# Patient Record
Sex: FEMALE | Race: WHITE | NOT HISPANIC OR LATINO | Employment: FULL TIME | URBAN - METROPOLITAN AREA
[De-identification: names, ages, dates, MRNs, and addresses within clinical notes are randomized per-mention and may not be internally consistent; named-entity substitution may affect disease eponyms.]

---

## 2019-10-14 ENCOUNTER — OFFICE VISIT (OUTPATIENT)
Dept: FAMILY MEDICINE CLINIC | Facility: CLINIC | Age: 44
End: 2019-10-14

## 2019-10-14 VITALS
HEIGHT: 67 IN | RESPIRATION RATE: 16 BRPM | BODY MASS INDEX: 33.27 KG/M2 | TEMPERATURE: 98.5 F | SYSTOLIC BLOOD PRESSURE: 140 MMHG | OXYGEN SATURATION: 97 % | WEIGHT: 212 LBS | DIASTOLIC BLOOD PRESSURE: 86 MMHG | HEART RATE: 82 BPM

## 2019-10-14 DIAGNOSIS — Z00.00 ANNUAL PHYSICAL EXAM: Primary | ICD-10-CM

## 2019-10-14 DIAGNOSIS — I10 ESSENTIAL HYPERTENSION: ICD-10-CM

## 2019-10-14 DIAGNOSIS — E05.00 GRAVES' DISEASE IN REMISSION: ICD-10-CM

## 2019-10-14 PROBLEM — E05.90 HYPERTHYROIDISM: Status: ACTIVE | Noted: 2019-06-05

## 2019-10-14 PROCEDURE — 99396 PREV VISIT EST AGE 40-64: CPT | Performed by: NURSE PRACTITIONER

## 2019-10-14 PROCEDURE — 93000 ELECTROCARDIOGRAM COMPLETE: CPT | Performed by: NURSE PRACTITIONER

## 2019-10-14 RX ORDER — METOPROLOL SUCCINATE 25 MG/1
25 TABLET, EXTENDED RELEASE ORAL DAILY
COMMUNITY
Start: 2019-07-02 | End: 2020-01-28 | Stop reason: SDUPTHER

## 2019-10-14 NOTE — PROGRESS NOTES
Patient ID: Nacho Schwartz is a 44 y.o. female     Subjective     Chief Complaint   Patient presents with   • Establish Care   • Hypertension       History of Present Illness    Nacho Schwartz presents to the office today for new patient evaluation for annual exam for continued management concerning hypertension.  She also has prior history of Graves' disease which is been in remission.  Previous PCP was CESIA Diaz with [facility].  She wanted to transfer to [facility].  New diagnosis of hypertension at beginning of year.  At the time, she was in school and working weekends.    Last 2 years moved twice. Increased stressors at home and work.  Feels has some correlation with her elevated blood pressure.  She originally was started on lisinopril however developed severe cough.  She was later switched to metoprolol with good control of symptoms.  She has enlarged thyroid.  States she has had a previous thyroid ultrasound however is been years ago.  Also was [place] state.  She is past due for Pap and mammogram.  Plans to contact GYN to schedule.      She denies any complaints of fever, chills, cough, chest pain, shortness of air, abdominal pain, nausea, or any other concerns.     The following portions of the patient's history were reviewed and updated as appropriate: allergies, current medications, past family history, past medical history, past social history, past surgical history and problem list.       Review of Systems   Constitution: Negative.   HENT: Negative.    Eyes: Negative.    Cardiovascular: Negative.    Respiratory: Negative.    Endocrine: Negative.    Hematologic/Lymphatic: Negative.    Skin: Negative.    Musculoskeletal: Negative.    Gastrointestinal: Negative.    Genitourinary: Negative.    Neurological: Negative.    Psychiatric/Behavioral: Negative.        Vitals:    10/14/19 1541   BP: 140/86   Pulse: 82   Resp: 16   Temp: 98.5 °F (36.9 °C)   SpO2: 97%       Documented weights     10/14/19 1541   Weight: 96.2 kg (212 lb)     Body mass index is 33.71 kg/m².    No results found for this or any previous visit.    Objective     Physical Exam   Constitutional: She is oriented to person, place, and time. Vital signs are normal. She appears well-developed.   HENT:   Head: Normocephalic and atraumatic.   Right Ear: Tympanic membrane normal.   Left Ear: Tympanic membrane normal.   Mouth/Throat: Oropharynx is clear and moist.   Eyes: EOM are normal. Pupils are equal, round, and reactive to light.   Neck: Normal range of motion. Neck supple. Thyromegaly present.   Cardiovascular: Normal rate, regular rhythm, normal heart sounds and intact distal pulses.   No murmur heard.  Manual B/P rechecked by me at 130/86.   Pulmonary/Chest: Effort normal and breath sounds normal. She has no wheezes. She has no rhonchi. She has no rales.   Abdominal: Soft. Bowel sounds are normal. There is no hepatosplenomegaly. There is no tenderness.   Musculoskeletal: Normal range of motion. She exhibits no edema or tenderness.   Lymphadenopathy:     She has no cervical adenopathy.   Neurological: She is alert and oriented to person, place, and time. She has normal strength.   Skin: Skin is warm and dry. No rash noted. No cyanosis or erythema.   Psychiatric: She has a normal mood and affect. Her behavior is normal.       ECG 12 Lead  Date/Time: 10/14/2019 4:28 PM  Performed by: Dolores Shepherd APRN  Authorized by: Dolores Shepherd APRN   Previous ECG: no previous ECG available  Rhythm: sinus rhythm  Rate: normal  Conduction: conduction normal  ST Segments: ST segments normal  T Waves: T waves normal  QRS axis: normal  Other: no other findings    Clinical impression: normal ECG               Assessment/Plan     Assessment/Plan     Nacho was seen today for establish care and hypertension.    Diagnoses and all orders for this visit:    Annual physical exam  -     Comprehensive Metabolic Panel; Future  -     Lipid Panel; Future  -      TSH; Future  -     Vitamin D 25 Hydroxy; Future  -     T3, Free; Future  -     T4, Free; Future  -     CBC (No Diff); Future    Essential hypertension  -     ECG 12 Lead  -     Comprehensive Metabolic Panel; Future  -     Lipid Panel; Future    Graves' disease in remission  -     TSH; Future  -     T3, Free; Future  -     T4, Free; Future      Summary:  Nacho Schwartz presents office today for new patient evaluation for annual physical exam.  She is not fasting today.  She also has to have her labs completed with [facility].  Provided her for written orders for labs to have completed when able for CBC, CMP, lipid panel, thyroid function test, and vitamin D level.  Once results are received, we will call her with results which will also determine further recommendations.  Possibly will need thyroid ultrasound however awaiting for thyroid function test prior to ordering.  EKG completed in office today was stable.  Her blood pressure is stable.  We will continue her on metoprolol XL 25 mg daily.  Continue to watch her diet and limit added salt.  Drink plenty of fluids and incorporate exercise into daily routine.  Advised to contact GYN to schedule Pap and mammogram as soon as able.  If all labs are stable, instructed she would need to return in 1 year for next annual physical.    In the meantime, instructed to contact us sooner for any problems or concerns.    Patient Counseling:  --Nutrition: Stressed importance of moderation in sodium/caffeine intake, saturated fat and cholesterol.  Discussed caloric balance, sufficient intake of fresh fruits, vegetables, fiber, calcium, iron.  --Exercise: Stressed the importance of regular exercise.   --Substance Abuse: Discussed cessation/primary prevention of tobacco, alcohol, or other drug use; driving or other dangerous activities under the influence.    --Dental health: Discussed importance of regular tooth brushing, flossing, and dental visits.  -- suggested having eyes and vision  checked if needed or past due.  --Immunizations reviewed.    Dolores Shepherd, APRN  Family Medicine  [facility] [place]  10/14/19  4:02 PM

## 2019-10-17 ENCOUNTER — RESULTS ENCOUNTER (OUTPATIENT)
Dept: FAMILY MEDICINE CLINIC | Facility: CLINIC | Age: 44
End: 2019-10-17

## 2019-10-17 DIAGNOSIS — I10 ESSENTIAL HYPERTENSION: ICD-10-CM

## 2019-10-17 DIAGNOSIS — E05.00 GRAVES' DISEASE IN REMISSION: ICD-10-CM

## 2019-10-17 DIAGNOSIS — Z00.00 ANNUAL PHYSICAL EXAM: ICD-10-CM

## 2020-01-28 RX ORDER — METOPROLOL SUCCINATE 25 MG/1
25 TABLET, EXTENDED RELEASE ORAL DAILY
Qty: 90 TABLET | Refills: 2 | Status: SHIPPED | OUTPATIENT
Start: 2020-01-28 | End: 2020-10-12 | Stop reason: SDUPTHER

## 2020-10-12 ENCOUNTER — OFFICE VISIT (OUTPATIENT)
Dept: FAMILY MEDICINE CLINIC | Facility: CLINIC | Age: 45
End: 2020-10-12

## 2020-10-12 ENCOUNTER — RESULTS ENCOUNTER (OUTPATIENT)
Dept: FAMILY MEDICINE CLINIC | Facility: CLINIC | Age: 45
End: 2020-10-12

## 2020-10-12 VITALS
DIASTOLIC BLOOD PRESSURE: 82 MMHG | WEIGHT: 216 LBS | TEMPERATURE: 98.1 F | SYSTOLIC BLOOD PRESSURE: 140 MMHG | HEART RATE: 90 BPM | OXYGEN SATURATION: 97 % | HEIGHT: 67 IN | RESPIRATION RATE: 16 BRPM | BODY MASS INDEX: 33.9 KG/M2

## 2020-10-12 DIAGNOSIS — I10 ESSENTIAL HYPERTENSION: ICD-10-CM

## 2020-10-12 DIAGNOSIS — Z12.11 COLON CANCER SCREENING: ICD-10-CM

## 2020-10-12 DIAGNOSIS — E05.00 GRAVES' DISEASE IN REMISSION: ICD-10-CM

## 2020-10-12 DIAGNOSIS — E04.1 THYROID NODULE: ICD-10-CM

## 2020-10-12 DIAGNOSIS — Z11.59 ENCOUNTER FOR HEPATITIS C SCREENING TEST FOR LOW RISK PATIENT: ICD-10-CM

## 2020-10-12 DIAGNOSIS — Z00.00 ANNUAL PHYSICAL EXAM: Primary | ICD-10-CM

## 2020-10-12 PROCEDURE — 99396 PREV VISIT EST AGE 40-64: CPT | Performed by: NURSE PRACTITIONER

## 2020-10-12 RX ORDER — METOPROLOL SUCCINATE 25 MG/1
25 TABLET, EXTENDED RELEASE ORAL DAILY
Qty: 90 TABLET | Refills: 3 | Status: SHIPPED | OUTPATIENT
Start: 2020-10-12 | End: 2020-12-10

## 2020-10-12 NOTE — PROGRESS NOTES
"Chief Complaint   Patient presents with   • Annual Exam       Subjective   Nacho Schwartz is a 45 y.o. female and is here for a yearly physical exam. The patient reports no problems.    Do you take any herbs or supplements that were not prescribed by a doctor? yes. If so, these will be added to active medication list.    Health Habits:  Dental Exam: Up to date  Eye Exam: Up to date  Diet: Salad daily for lunch    Exercise: None   Current exercise activities include: N/A  Except walking on weekends.    Pap: 2/2020 and was negative  Mammogram: 2/2020 and was negative.    Colonoscopy: N/A    The following portions of the patient's history were reviewed and updated as appropriate: allergies, current medications, past family history, past medical history, past social history, past surgical history and problem list.    Social and Family and Surgical History reviewed and updated today, see Rooming tab.    Health History, Preventive Measures and Vaccination flow sheets reviewed and updated today.    Patient's current medical chart in Epic; including previous office notes, imaging, labs, specialist's evaluation either in notes or in Media tab reviewed today.    Other pertinent medical information also reviewed thru Care Everywhere function is also reviewed today.    Review of Systems  Review of Systems   Constitutional: Negative.    HENT: Negative.    Respiratory: Negative.    Cardiovascular: Negative.    Gastrointestinal: Negative.    Endocrine: Negative.    Genitourinary: Negative.    Musculoskeletal: Negative.    Skin: Negative.    Neurological: Negative.    Hematological: Negative.    Psychiatric/Behavioral: Negative.           Vitals:    10/12/20 1537   BP: 140/82   BP Location: Left arm   Patient Position: Sitting   Cuff Size: Adult   Pulse: 90   Resp: 16   Temp: 98.1 °F (36.7 °C)   SpO2: 97%   Weight: 98 kg (216 lb)   Height: 168.9 cm (66.5\")       General Appearance:  Alert, cooperative, no distress, appears stated age "   Head:  Normocephalic, without obvious abnormality, atraumatic   Eyes:  PERRL, conjunctiva/corneas clear, EOM's intact.   Ears:  Normal TM's and external ear canals, both ears   Nose: Nares normal, septum midline, mucosa normal, no drainage or sinus tenderness   Throat: Lips, mucosa, and tongue normal; teeth and gums normal   Neck: Supple, symmetrical, trachea midline, no adenopathy;   thyroid: No enlargement/tenderness/nodules   Back:  Symmetric, no curvature, ROM normal, no CVA tenderness   Lungs:  Clear to auscultation bilaterally, respirations even and unlabored   Chest wall:  No tenderness or deformity   Heart:  Regular rate and rhythm, S1 and S2 normal, no murmur, rub or gallop   Abdomen:  Soft, non-tender, bowel sounds active all four quadrants,   no masses, no organomegaly           Extremities: Extremities normal, atraumatic, no cyanosis or edema   Pulses: 2+ and symmetric all extremities   Skin: Skin color, texture, turgor normal, no rashes or lesions   Lymph nodes: Cervical, supraclavicular, and axillary nodes normal   Neurologic: CNII-XII intact. Normal strength, sensation and reflexes   throughout      No results found for this or any previous visit.  Assessment/Plan   Healthy female exam.    Nacho Schwartz has been doing well since she was last seen.  She is due for fasting labs which she has completed at [facility].  Provided written orders to patient today.  We will call her with results once completed.  Blood pressure 140/82.  No current changes in metoprolol dose.  Continue to monitor blood pressure and let us know if stays greater than 140/90.  Limit added salt.  Incorporate exercise into daily routine.  She has prior history of Graves' disease however not needed any medication.  She states she has had thyroid ultrasound many years ago and was told had a small thyroid nodule.  Instructed will obtain updated ultrasound of thyroid for continued surveillance.  Cologuard screening ordered however instructed  to contact insurance to assure covered.  Patient verbalized understanding.  If all labs are stable, instructed return to office in 1 year for next annual physical with fasting labs.  In the meantime instructed to contact us sooner for any problems or concerns.    Diagnoses and all orders for this visit:    1. Annual physical exam (Primary)  -     CBC & Differential; Future  -     Comprehensive Metabolic Panel; Future  -     Lipid Panel; Future  -     TSH; Future  -     T4, Free; Future  -     Urinalysis With Culture If Indicated - Urine, Clean Catch; Future    2. Encounter for hepatitis C screening test for low risk patient  -     Hepatitis C Antibody; Future    3. Essential hypertension  -     CBC & Differential; Future  -     Comprehensive Metabolic Panel; Future  -     Lipid Panel; Future  -     TSH; Future  -     T4, Free; Future  -     metoprolol succinate XL (TOPROL-XL) 25 MG 24 hr tablet; Take 1 tablet by mouth Daily.  Dispense: 90 tablet; Refill: 3    4. Graves' disease in remission  -     TSH; Future  -     T4, Free; Future  -     US Thyroid; Future    5. Thyroid nodule  -     US Thyroid; Future    6. Colon cancer screening  -     Cologuard - Stool, Per Rectum; Future      1. Annual physical exam  2. Patient Counseling:  --Nutrition: Stressed importance of moderation in sodium/caffeine intake,      saturated fat and cholesterol.  Discussed caloric balance, sufficient intake of fresh fruits, vegetables, fiber, calcium, iron.  --Discussed the new recommendation against daily use of baby aspirin for primary prevention in low risk patients.  --Exercise: Stressed the importance of regular exercise.   --Substance Abuse: Discussed cessation/primary prevention of tobacco, alcohol,   or other drug use; driving or other dangerous activities under the influence.    --Dental health: Discussed importance of regular tooth brushing, flossing, and       dental visits.  --Suggested having eyes and vision checked if needed  or past due.  --Immunizations reviewed.  --Discussed benefits of screening colonoscopy.  3. Discussed the patient's BMI with her.  The BMI is above average; BMI management plan is completed  4. Follow up next physical in 1 year    There are no Patient Instructions on file for this visit.    Medications Discontinued During This Encounter   Medication Reason   • metoprolol succinate XL (TOPROL-XL) 25 MG 24 hr tablet Reorder          Dolores Shepherd, APRN  Family Practice  [facility] [place]

## 2020-10-14 ENCOUNTER — RESULTS ENCOUNTER (OUTPATIENT)
Dept: FAMILY MEDICINE CLINIC | Facility: CLINIC | Age: 45
End: 2020-10-14

## 2020-10-14 DIAGNOSIS — Z11.59 ENCOUNTER FOR HEPATITIS C SCREENING TEST FOR LOW RISK PATIENT: ICD-10-CM

## 2020-10-14 DIAGNOSIS — I10 ESSENTIAL HYPERTENSION: ICD-10-CM

## 2020-10-14 DIAGNOSIS — Z00.00 ANNUAL PHYSICAL EXAM: ICD-10-CM

## 2020-10-14 DIAGNOSIS — E05.00 GRAVES' DISEASE IN REMISSION: ICD-10-CM

## 2020-10-19 ENCOUNTER — TELEPHONE (OUTPATIENT)
Dept: FAMILY MEDICINE CLINIC | Facility: CLINIC | Age: 45
End: 2020-10-19

## 2020-10-19 NOTE — TELEPHONE ENCOUNTER
PATIENT WAS SEEN LAST  WEEK ON 10-12-20 AND WAS GIVEN A LIST OF PROCEDURES    PATIENT CHECK HER INSURANCE AND PATIENT WANTED TO KNOW IF THE ULTRASOUND OF THE THYROID AND COLOGUARD IS SOMETHING THAT WAS NEEDED AT THIS TIME    PATIENT INSURANCE WOULD COVER THE ULTRASOUND BUT HER OUT OF POCKET WOULD BE $500 AND     THE COLOGUARD  PATIENT INSURANCE WOULD ONLY PAY A SMALL AMOUNT UNTIL SHE TURNS 50 YRS THEN THEY WOULD PAY IN FULL        PLEASE CONTACT PATIENT @[phone] OR IN MY CHART

## 2020-10-19 NOTE — TELEPHONE ENCOUNTER
1.  Notify patient that she can hold off on the Cologuard testing until she is 48 years of age.  2.  With her history of Graves' disease and prior abnormal ultrasounds of thyroid, would recommend she proceed with a thyroid ultrasound if she is agreeable.  Dolores is out of office this week if she would like to discuss with her when she returns she may do so as well.

## 2020-10-22 ENCOUNTER — HOSPITAL ENCOUNTER (OUTPATIENT)
Dept: ULTRASOUND IMAGING | Facility: HOSPITAL | Age: 45
Discharge: HOME | End: 2020-10-22
Admitting: NURSE PRACTITIONER

## 2020-10-22 DIAGNOSIS — E04.1 THYROID NODULE: ICD-10-CM

## 2020-10-22 DIAGNOSIS — E05.00 GRAVES' DISEASE IN REMISSION: ICD-10-CM

## 2020-10-22 PROCEDURE — 76536 US EXAM OF HEAD AND NECK: CPT

## 2020-11-25 ENCOUNTER — TELEPHONE (OUTPATIENT)
Dept: FAMILY MEDICINE CLINIC | Facility: CLINIC | Age: 45
End: 2020-11-25

## 2020-11-25 ENCOUNTER — CLINICAL SUPPORT (OUTPATIENT)
Dept: FAMILY MEDICINE CLINIC | Facility: CLINIC | Age: 45
End: 2020-11-25

## 2020-11-25 VITALS — DIASTOLIC BLOOD PRESSURE: 84 MMHG | SYSTOLIC BLOOD PRESSURE: 130 MMHG

## 2020-11-25 DIAGNOSIS — I10 HTN, GOAL BELOW 130/80: Primary | ICD-10-CM

## 2020-11-25 NOTE — TELEPHONE ENCOUNTER
Double up home metoprolol to 50 mg a day, ( two of the 25s)    Then see Dolores in 2 weeks in office

## 2020-11-25 NOTE — TELEPHONE ENCOUNTER
Pt came in today for blood pressure check. Left arm was at 126/80, right arm 130/84. Pt states she had it checked this morning at work which it read 178/110 left, 152/92 right. This afternoon rechecked it again, both arms were reading in the 150s/90s. She said she has been having a headache the past few days, and feeling anxiety build up. Dolores has not changed any medicines only advised her to come In for a check. Please advise.

## 2020-12-10 ENCOUNTER — OFFICE VISIT (OUTPATIENT)
Dept: FAMILY MEDICINE CLINIC | Facility: CLINIC | Age: 45
End: 2020-12-10

## 2020-12-10 VITALS
HEIGHT: 67 IN | TEMPERATURE: 98 F | SYSTOLIC BLOOD PRESSURE: 164 MMHG | BODY MASS INDEX: 34.21 KG/M2 | DIASTOLIC BLOOD PRESSURE: 102 MMHG | OXYGEN SATURATION: 97 % | WEIGHT: 218 LBS | RESPIRATION RATE: 16 BRPM | HEART RATE: 92 BPM

## 2020-12-10 DIAGNOSIS — I10 ESSENTIAL HYPERTENSION: ICD-10-CM

## 2020-12-10 DIAGNOSIS — I10 UNCONTROLLED HYPERTENSION: Primary | ICD-10-CM

## 2020-12-10 PROCEDURE — 99213 OFFICE O/P EST LOW 20 MIN: CPT | Performed by: NURSE PRACTITIONER

## 2020-12-10 RX ORDER — LOSARTAN POTASSIUM 50 MG/1
50 TABLET ORAL DAILY
Qty: 30 TABLET | Refills: 2 | Status: SHIPPED | OUTPATIENT
Start: 2020-12-10 | End: 2020-12-28 | Stop reason: SDUPTHER

## 2020-12-10 RX ORDER — METOPROLOL SUCCINATE 25 MG/1
25 TABLET, EXTENDED RELEASE ORAL DAILY
Start: 2020-12-10 | End: 2020-12-28 | Stop reason: SDUPTHER

## 2020-12-10 NOTE — PROGRESS NOTES
Patient ID: Nacho Schwartz is a 45 y.o. female     Patient Care Team:  Head, CESIA Granger as PCP - General (Nurse Practitioner)    Subjective     Chief Complaint   Patient presents with   • Hypertension       History of Present Illness    Nacho Schwartz presents to the office today for follow-up on hypertension.   On November 25, 2020 she reported office for blood pressure check.  At that time she was again elevated with readings at work of 178/110.  When she presented to office readings were lower at 126/80 in the left arm and 130/80 for the right arm.  At the time she was taken metoprolol XL 25 mg daily.  Due to self-reported blood pressures at work and increased anxiety and headache, instructed to increase metoprolol dose to 50 mg daily.  She started taking 2 at night.  Initially she felt better with decreased headache however she does have continued feelings of edginess.  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  She also has a  that is undergoing NTI for school which is increased stress at home.  No recent change in diet or exercise.  She does admit to not being able to exercise like she should due to busy work schedule.      She denies any complaints of fever, chills, cough, chest pain, shortness of air, leg swelling, heart palpitations, abdominal pain, nausea, or any other concerns.     The following portions of the patient's history were reviewed and updated as appropriate: allergies, current medications, past family history, past medical history, past social history, past surgical history and problem list.       Review of Systems   Constitution: Negative.   HENT: Negative.    Eyes: Negative.    Cardiovascular: Negative.    Respiratory: Negative.    Endocrine: Negative.    Hematologic/Lymphatic: Negative.    Skin: Negative.    Musculoskeletal: Negative.    Gastrointestinal: Negative.    Genitourinary: Negative.    Neurological:  "Negative.    Psychiatric/Behavioral: Negative.         \"Feels edgy/anxious\"       Vitals:    12/10/20 1543   BP: (!) 164/102   Pulse: 92   Resp: 16   Temp: 98 °F (36.7 °C)   SpO2: 97%       Documented weights    12/10/20 1543   Weight: 98.9 kg (218 lb)     Body mass index is 34.66 kg/m².    No results found for this or any previous visit.    Objective     Physical Exam  Constitutional:       General: She is not in acute distress.  HENT:      Head: Normocephalic and atraumatic.   Cardiovascular:      Rate and Rhythm: Normal rate and regular rhythm.      Heart sounds: No murmur.   Pulmonary:      Effort: Pulmonary effort is normal.      Breath sounds: Normal breath sounds. No wheezing.   Musculoskeletal: Normal range of motion.      Right lower leg: No edema.      Left lower leg: No edema.   Skin:     General: Skin is warm and dry.   Neurological:      Mental Status: She is alert and oriented to person, place, and time.   Psychiatric:         Mood and Affect: Mood normal.            Assessment/Plan     Assessment/Plan     Diagnoses and all orders for this visit:    1. Uncontrolled hypertension (Primary)  -     losartan (Cozaar) 50 MG tablet; Take 1 tablet by mouth Daily.  Dispense: 30 tablet; Refill: 2    2. Essential hypertension  -     metoprolol succinate XL (TOPROL-XL) 25 MG 24 hr tablet; Take 1 tablet by mouth Daily.          Summary:  Nacho Schwartz presents office today for follow-up on blood pressure.  Appears to increase in her dose of metoprolol did not help her blood pressure at all.  Currently elevated at 164/102.  She states at one time she was taking lisinopril which she did not tolerate due to cough.  Do not feel she is a good candidate for hydrochlorothiazide due to needing to take her medication at night.  We will start her on losartan 50 mg nightly.  Since she has had no improvement with doubling metoprolol dose, instructed to go back to metoprolol XL 25 mg daily.  She does not have a cuff at home.  " Advised for her to obtain one in order to monitor her blood pressures at home.  She is to keep a log.  She is to let us know if continues to stay greater than 140/90 or does not tolerate new medication.  Also watch salt intake and incorporate exercise into daily routine when able.  We will have her return in 2 weeks for next blood pressure recheck appointment.  In the meantime, instructed to contact us sooner for any problems or concerns.    Patient Instructions   Hypertension, Adult  Hypertension is another name for high blood pressure. High blood pressure forces your heart to work harder to pump blood. This can cause problems over time.  There are two numbers in a blood pressure reading. There is a top number (systolic) over a bottom number (diastolic). It is best to have a blood pressure that is below 120/80. Healthy choices can help lower your blood pressure, or you may need medicine to help lower it.  What are the causes?  The cause of this condition is not known. Some conditions may be related to high blood pressure.  What increases the risk?  · Smoking.  · Having type 2 diabetes mellitus, high cholesterol, or both.  · Not getting enough exercise or physical activity.  · Being overweight.  · Having too much fat, sugar, calories, or salt (sodium) in your diet.  · Drinking too much alcohol.  · Having long-term (chronic) kidney disease.  · Having a family history of high blood pressure.  · Age. Risk increases with age.  · Race. You may be at higher risk if you are .  · Gender. Men are at higher risk than women before age 45. After age 65, women are at higher risk than men.  · Having obstructive sleep apnea.  · Stress.  What are the signs or symptoms?  · High blood pressure may not cause symptoms. Very high blood pressure (hypertensive crisis) may cause:  ? Headache.  ? Feelings of worry or nervousness (anxiety).  ? Shortness of breath.  ? Nosebleed.  ? A feeling of being sick to your stomach  (nausea).  ? Throwing up (vomiting).  ? Changes in how you see.  ? Very bad chest pain.  ? Seizures.  How is this treated?  · This condition is treated by making healthy lifestyle changes, such as:  ? Eating healthy foods.  ? Exercising more.  ? Drinking less alcohol.  · Your health care provider may prescribe medicine if lifestyle changes are not enough to get your blood pressure under control, and if:  ? Your top number is above 130.  ? Your bottom number is above 80.  · Your personal target blood pressure may vary.  Follow these instructions at home:  Eating and drinking    · If told, follow the DASH eating plan. To follow this plan:  ? Fill one half of your plate at each meal with fruits and vegetables.  ? Fill one fourth of your plate at each meal with whole grains. Whole grains include whole-wheat pasta, brown rice, and whole-grain bread.  ? Eat or drink low-fat dairy products, such as skim milk or low-fat yogurt.  ? Fill one fourth of your plate at each meal with low-fat (lean) proteins. Low-fat proteins include fish, chicken without skin, eggs, beans, and tofu.  ? Avoid fatty meat, cured and processed meat, or chicken with skin.  ? Avoid pre-made or processed food.  · Eat less than 1,500 mg of salt each day.  · Do not drink alcohol if:  ? Your doctor tells you not to drink.  ? You are pregnant, may be pregnant, or are planning to become pregnant.  · If you drink alcohol:  ? Limit how much you use to:  § 0-1 drink a day for women.  § 0-2 drinks a day for men.  ? Be aware of how much alcohol is in your drink. In the [place] one drink equals one 12 oz bottle of beer (355 mL), one 5 oz glass of wine (148 mL), or one 1½ oz glass of hard liquor (44 mL).  Lifestyle    · Work with your doctor to stay at a healthy weight or to lose weight. Ask your doctor what the best weight is for you.  · Get at least 30 minutes of exercise most days of the week. This may include walking, swimming, or biking.  · Get at least 30  minutes of exercise that strengthens your muscles (resistance exercise) at least 3 days a week. This may include lifting weights or doing Pilates.  · Do not use any products that contain nicotine or tobacco, such as cigarettes, e-cigarettes, and chewing tobacco. If you need help quitting, ask your doctor.  · Check your blood pressure at home as told by your doctor.  · Keep all follow-up visits as told by your doctor. This is important.  Medicines  · Take over-the-counter and prescription medicines only as told by your doctor. Follow directions carefully.  · Do not skip doses of blood pressure medicine. The medicine does not work as well if you skip doses. Skipping doses also puts you at risk for problems.  · Ask your doctor about side effects or reactions to medicines that you should watch for.  Contact a doctor if you:  · Think you are having a reaction to the medicine you are taking.  · Have headaches that keep coming back (recurring).  · Feel dizzy.  · Have swelling in your ankles.  · Have trouble with your vision.  Get help right away if you:  · Get a very bad headache.  · Start to feel mixed up (confused).  · Feel weak or numb.  · Feel faint.  · Have very bad pain in your:  ? Chest.  ? Belly (abdomen).  · Throw up more than once.  · Have trouble breathing.  Summary  · Hypertension is another name for high blood pressure.  · High blood pressure forces your heart to work harder to pump blood.  · For most people, a normal blood pressure is less than 120/80.  · Making healthy choices can help lower blood pressure. If your blood pressure does not get lower with healthy choices, you may need to take medicine.  This information is not intended to replace advice given to you by your health care provider. Make sure you discuss any questions you have with your health care provider.  Document Revised: 08/28/2019 Document Reviewed: 08/28/2019  [employer] Patient Education © 2020 [employer]        Patient was wearing  facemask when I entered the room and throughout our encounter. Protective equipment was worn throughout this patient encounter including a face mask and gloves. Hand hygiene was performed before donning protective equipment and after removal when leaving the room.     Dolores Shepherd, APRN  Family Medicine  [facility]  12/10/20  16:28 EST

## 2020-12-10 NOTE — PATIENT INSTRUCTIONS
Hypertension, Adult  Hypertension is another name for high blood pressure. High blood pressure forces your heart to work harder to pump blood. This can cause problems over time.  There are two numbers in a blood pressure reading. There is a top number (systolic) over a bottom number (diastolic). It is best to have a blood pressure that is below 120/80. Healthy choices can help lower your blood pressure, or you may need medicine to help lower it.  What are the causes?  The cause of this condition is not known. Some conditions may be related to high blood pressure.  What increases the risk?  · Smoking.  · Having type 2 diabetes mellitus, high cholesterol, or both.  · Not getting enough exercise or physical activity.  · Being overweight.  · Having too much fat, sugar, calories, or salt (sodium) in your diet.  · Drinking too much alcohol.  · Having long-term (chronic) kidney disease.  · Having a family history of high blood pressure.  · Age. Risk increases with age.  · Race. You may be at higher risk if you are .  · Gender. Men are at higher risk than women before age 45. After age 65, women are at higher risk than men.  · Having obstructive sleep apnea.  · Stress.  What are the signs or symptoms?  · High blood pressure may not cause symptoms. Very high blood pressure (hypertensive crisis) may cause:  ? Headache.  ? Feelings of worry or nervousness (anxiety).  ? Shortness of breath.  ? Nosebleed.  ? A feeling of being sick to your stomach (nausea).  ? Throwing up (vomiting).  ? Changes in how you see.  ? Very bad chest pain.  ? Seizures.  How is this treated?  · This condition is treated by making healthy lifestyle changes, such as:  ? Eating healthy foods.  ? Exercising more.  ? Drinking less alcohol.  · Your health care provider may prescribe medicine if lifestyle changes are not enough to get your blood pressure under control, and if:  ? Your top number is above 130.  ? Your bottom number is above  80.  · Your personal target blood pressure may vary.  Follow these instructions at home:  Eating and drinking    · If told, follow the DASH eating plan. To follow this plan:  ? Fill one half of your plate at each meal with fruits and vegetables.  ? Fill one fourth of your plate at each meal with whole grains. Whole grains include whole-wheat pasta, brown rice, and whole-grain bread.  ? Eat or drink low-fat dairy products, such as skim milk or low-fat yogurt.  ? Fill one fourth of your plate at each meal with low-fat (lean) proteins. Low-fat proteins include fish, chicken without skin, eggs, beans, and tofu.  ? Avoid fatty meat, cured and processed meat, or chicken with skin.  ? Avoid pre-made or processed food.  · Eat less than 1,500 mg of salt each day.  · Do not drink alcohol if:  ? Your doctor tells you not to drink.  ? You are pregnant, may be pregnant, or are planning to become pregnant.  · If you drink alcohol:  ? Limit how much you use to:  § 0-1 drink a day for women.  § 0-2 drinks a day for men.  ? Be aware of how much alcohol is in your drink. In the [place] one drink equals one 12 oz bottle of beer (355 mL), one 5 oz glass of wine (148 mL), or one 1½ oz glass of hard liquor (44 mL).  Lifestyle    · Work with your doctor to stay at a healthy weight or to lose weight. Ask your doctor what the best weight is for you.  · Get at least 30 minutes of exercise most days of the week. This may include walking, swimming, or biking.  · Get at least 30 minutes of exercise that strengthens your muscles (resistance exercise) at least 3 days a week. This may include lifting weights or doing Pilates.  · Do not use any products that contain nicotine or tobacco, such as cigarettes, e-cigarettes, and chewing tobacco. If you need help quitting, ask your doctor.  · Check your blood pressure at home as told by your doctor.  · Keep all follow-up visits as told by your doctor. This is important.  Medicines  · Take over-the-counter  and prescription medicines only as told by your doctor. Follow directions carefully.  · Do not skip doses of blood pressure medicine. The medicine does not work as well if you skip doses. Skipping doses also puts you at risk for problems.  · Ask your doctor about side effects or reactions to medicines that you should watch for.  Contact a doctor if you:  · Think you are having a reaction to the medicine you are taking.  · Have headaches that keep coming back (recurring).  · Feel dizzy.  · Have swelling in your ankles.  · Have trouble with your vision.  Get help right away if you:  · Get a very bad headache.  · Start to feel mixed up (confused).  · Feel weak or numb.  · Feel faint.  · Have very bad pain in your:  ? Chest.  ? Belly (abdomen).  · Throw up more than once.  · Have trouble breathing.  Summary  · Hypertension is another name for high blood pressure.  · High blood pressure forces your heart to work harder to pump blood.  · For most people, a normal blood pressure is less than 120/80.  · Making healthy choices can help lower blood pressure. If your blood pressure does not get lower with healthy choices, you may need to take medicine.  This information is not intended to replace advice given to you by your health care provider. Make sure you discuss any questions you have with your health care provider.  Document Revised: 08/28/2019 Document Reviewed: 08/28/2019  [employer] Patient Education © 2020 [employer]

## 2020-12-28 ENCOUNTER — OFFICE VISIT (OUTPATIENT)
Dept: FAMILY MEDICINE CLINIC | Facility: CLINIC | Age: 45
End: 2020-12-28

## 2020-12-28 VITALS
WEIGHT: 218 LBS | BODY MASS INDEX: 34.21 KG/M2 | SYSTOLIC BLOOD PRESSURE: 140 MMHG | OXYGEN SATURATION: 98 % | TEMPERATURE: 97.8 F | RESPIRATION RATE: 16 BRPM | HEIGHT: 67 IN | HEART RATE: 87 BPM | DIASTOLIC BLOOD PRESSURE: 94 MMHG

## 2020-12-28 DIAGNOSIS — I10 UNCONTROLLED HYPERTENSION: Primary | ICD-10-CM

## 2020-12-28 DIAGNOSIS — E05.00 GRAVES' DISEASE IN REMISSION: ICD-10-CM

## 2020-12-28 DIAGNOSIS — I10 ESSENTIAL HYPERTENSION: ICD-10-CM

## 2020-12-28 PROCEDURE — 99213 OFFICE O/P EST LOW 20 MIN: CPT | Performed by: NURSE PRACTITIONER

## 2020-12-28 RX ORDER — METOPROLOL SUCCINATE 25 MG/1
50 TABLET, EXTENDED RELEASE ORAL DAILY
Qty: 60 TABLET | Refills: 0 | Status: SHIPPED | OUTPATIENT
Start: 2020-12-28 | End: 2021-01-10 | Stop reason: SDUPTHER

## 2020-12-28 RX ORDER — HYDROCHLOROTHIAZIDE 12.5 MG/1
12.5 TABLET ORAL DAILY
Qty: 30 TABLET | Refills: 2 | Status: SHIPPED | OUTPATIENT
Start: 2020-12-28 | End: 2021-03-15 | Stop reason: SDUPTHER

## 2020-12-28 RX ORDER — LOSARTAN POTASSIUM 100 MG/1
100 TABLET ORAL DAILY
Qty: 30 TABLET | Refills: 2 | Status: SHIPPED | OUTPATIENT
Start: 2020-12-28 | End: 2021-03-15 | Stop reason: SDUPTHER

## 2020-12-28 NOTE — PROGRESS NOTES
Patient ID: Nacho Schwartz is a 45 y.o. female     Patient Care Team:  Head, CESIA Granger as PCP - General (Nurse Practitioner)    Subjective     Chief Complaint   Patient presents with   • Hypertension       History of Present Illness    Nacho Schwartz presents to the office today for follow-up concerning hypertension.  Since she was last seen, she does not feel like her blood pressure is well controlled.  She was initially taken metoprolol 50 mg daily.  When last seen in office on December 10, 2020, blood pressure was elevated to 164/102.  We had added losartan 50 mg.  Was holding on starting hydrochlorothiazide due to needing to take medication at night to remember.  She has been checking her blood pressure at home and has been ranging from 140s to 170s.  She still continues to have chest tightness which she is experienced with her elevated blood pressure.  She does admit to increased anxiety and stress due to work.  Unsure if related.  During office visit today, she does bring of how her mother  at a young age of 48.  Unknown cause.  However knows that  found her unresponsive in shower, started CPR.  She woke up briefly and then became unresponsive.  When EMS arrived she was in PEA.  She is concerned of her mother's history and her uncontrolled blood pressure.  She denies any complaints of fever, chills, cough, chest pain, shortness of air, abdominal pain, nausea, or any other concerns.     The following portions of the patient's history were reviewed and updated as appropriate: allergies, current medications, past family history, past medical history, past social history, past surgical history and problem list.       Review of Systems   Constitution: Negative.   HENT: Negative.    Eyes: Negative.    Cardiovascular: Negative.         Hypertension   Respiratory: Negative.    Endocrine: Negative.    Hematologic/Lymphatic: Negative.    Skin: Negative.    Musculoskeletal: Negative.    Gastrointestinal:  Negative.    Genitourinary: Negative.    Neurological: Negative.    Psychiatric/Behavioral: Negative.        Vitals:    12/28/20 1616   BP: 140/94   Pulse: 87   Resp: 16   Temp: 97.8 °F (36.6 °C)   SpO2: 98%       Documented weights    12/28/20 1616   Weight: 98.9 kg (218 lb)     Body mass index is 34.66 kg/m².    No results found for this or any previous visit.    Objective     Physical Exam  Vitals signs reviewed.   HENT:      Head: Normocephalic and atraumatic.   Cardiovascular:      Rate and Rhythm: Normal rate and regular rhythm.      Heart sounds: No murmur.      Comments: Blood pressure rechecked by me and left arm 170/110.  Similar reading in right arm of 172/110.  Pulmonary:      Effort: Pulmonary effort is normal.      Breath sounds: Normal breath sounds. No wheezing.   Musculoskeletal:      Right lower leg: No edema.      Left lower leg: No edema.   Skin:     General: Skin is warm and dry.   Neurological:      Mental Status: She is alert and oriented to person, place, and time.            Assessment/Plan     Assessment/Plan     Diagnoses and all orders for this visit:    1. Uncontrolled hypertension (Primary)  -     T4, Free; Future  -     T3, Free; Future  -     CBC & Differential; Future  -     Comprehensive Metabolic Panel; Future  -     TSH; Future  -     Thyroid Peroxidase Antibody; Future  -     losartan (COZAAR) 100 MG tablet; Take 1 tablet by mouth Daily.  Dispense: 30 tablet; Refill: 2  -     hydroCHLOROthiazide (HYDRODIURIL) 12.5 MG tablet; Take 1 tablet by mouth Daily.  Dispense: 30 tablet; Refill: 2  -     Ambulatory Referral to Cardiology    2. Graves' disease in remission  -     T4, Free; Future  -     T3, Free; Future  -     CBC & Differential; Future  -     Comprehensive Metabolic Panel; Future  -     TSH; Future  -     Thyroid Peroxidase Antibody; Future    3. Essential hypertension  -     metoprolol succinate XL (TOPROL-XL) 25 MG 24 hr tablet; Take 2 tablets by mouth Daily.  Dispense: 60  tablet; Refill: 0      Summary:  Nacho Schwartz presents office today for follow-up concerning hypertension.  Her blood pressure remains uncontrolled with starting losartan 50 mg daily.  We will increase her to 100 mg daily.  Continue metoprolol 50 mg daily.  Also would like her to try hydrochlorothiazide however instructed take in the morning due to causing increased urination.  She plans to take as directed.  Continue to monitor blood pressure at home.  Watch salt intake.  Drink plenty of fluids.  Also with her family history and unknown cause of mother's death at a young age, along with unable to manage her blood pressure, recommend further evaluation per cardiology to assure stability.  Patient agrees.  She also has had problems with thyroid function test being abnormal causing similar symptoms.  We will recheck labs which she has completed with [facility].  We will call her with results.  Depending on cardiology evaluation will determine further recommendations and when next follow-up with me will be.    In the meantime, instructed to contact us sooner for any problems or concerns.    Patient Instructions   Hypertension, Adult  Hypertension is another name for high blood pressure. High blood pressure forces your heart to work harder to pump blood. This can cause problems over time.  There are two numbers in a blood pressure reading. There is a top number (systolic) over a bottom number (diastolic). It is best to have a blood pressure that is below 120/80. Healthy choices can help lower your blood pressure, or you may need medicine to help lower it.  What are the causes?  The cause of this condition is not known. Some conditions may be related to high blood pressure.  What increases the risk?  · Smoking.  · Having type 2 diabetes mellitus, high cholesterol, or both.  · Not getting enough exercise or physical activity.  · Being overweight.  · Having too much fat, sugar, calories, or salt (sodium) in your  diet.  · Drinking too much alcohol.  · Having long-term (chronic) kidney disease.  · Having a family history of high blood pressure.  · Age. Risk increases with age.  · Race. You may be at higher risk if you are .  · Gender. Men are at higher risk than women before age 45. After age 65, women are at higher risk than men.  · Having obstructive sleep apnea.  · Stress.  What are the signs or symptoms?  · High blood pressure may not cause symptoms. Very high blood pressure (hypertensive crisis) may cause:  ? Headache.  ? Feelings of worry or nervousness (anxiety).  ? Shortness of breath.  ? Nosebleed.  ? A feeling of being sick to your stomach (nausea).  ? Throwing up (vomiting).  ? Changes in how you see.  ? Very bad chest pain.  ? Seizures.  How is this treated?  · This condition is treated by making healthy lifestyle changes, such as:  ? Eating healthy foods.  ? Exercising more.  ? Drinking less alcohol.  · Your health care provider may prescribe medicine if lifestyle changes are not enough to get your blood pressure under control, and if:  ? Your top number is above 130.  ? Your bottom number is above 80.  · Your personal target blood pressure may vary.  Follow these instructions at home:  Eating and drinking    · If told, follow the DASH eating plan. To follow this plan:  ? Fill one half of your plate at each meal with fruits and vegetables.  ? Fill one fourth of your plate at each meal with whole grains. Whole grains include whole-wheat pasta, brown rice, and whole-grain bread.  ? Eat or drink low-fat dairy products, such as skim milk or low-fat yogurt.  ? Fill one fourth of your plate at each meal with low-fat (lean) proteins. Low-fat proteins include fish, chicken without skin, eggs, beans, and tofu.  ? Avoid fatty meat, cured and processed meat, or chicken with skin.  ? Avoid pre-made or processed food.  · Eat less than 1,500 mg of salt each day.  · Do not drink alcohol if:  ? Your doctor tells  you not to drink.  ? You are pregnant, may be pregnant, or are planning to become pregnant.  · If you drink alcohol:  ? Limit how much you use to:  § 0-1 drink a day for women.  § 0-2 drinks a day for men.  ? Be aware of how much alcohol is in your drink. In the [place] one drink equals one 12 oz bottle of beer (355 mL), one 5 oz glass of wine (148 mL), or one 1½ oz glass of hard liquor (44 mL).  Lifestyle    · Work with your doctor to stay at a healthy weight or to lose weight. Ask your doctor what the best weight is for you.  · Get at least 30 minutes of exercise most days of the week. This may include walking, swimming, or biking.  · Get at least 30 minutes of exercise that strengthens your muscles (resistance exercise) at least 3 days a week. This may include lifting weights or doing Pilates.  · Do not use any products that contain nicotine or tobacco, such as cigarettes, e-cigarettes, and chewing tobacco. If you need help quitting, ask your doctor.  · Check your blood pressure at home as told by your doctor.  · Keep all follow-up visits as told by your doctor. This is important.  Medicines  · Take over-the-counter and prescription medicines only as told by your doctor. Follow directions carefully.  · Do not skip doses of blood pressure medicine. The medicine does not work as well if you skip doses. Skipping doses also puts you at risk for problems.  · Ask your doctor about side effects or reactions to medicines that you should watch for.  Contact a doctor if you:  · Think you are having a reaction to the medicine you are taking.  · Have headaches that keep coming back (recurring).  · Feel dizzy.  · Have swelling in your ankles.  · Have trouble with your vision.  Get help right away if you:  · Get a very bad headache.  · Start to feel mixed up (confused).  · Feel weak or numb.  · Feel faint.  · Have very bad pain in your:  ? Chest.  ? Belly (abdomen).  · Throw up more than once.  · Have trouble  breathing.  Summary  · Hypertension is another name for high blood pressure.  · High blood pressure forces your heart to work harder to pump blood.  · For most people, a normal blood pressure is less than 120/80.  · Making healthy choices can help lower blood pressure. If your blood pressure does not get lower with healthy choices, you may need to take medicine.  This information is not intended to replace advice given to you by your health care provider. Make sure you discuss any questions you have with your health care provider.  Document Revised: 08/28/2019 Document Reviewed: 08/28/2019  Elsevier Patient Education © 2020 [employer]        Patient was wearing facemask when I entered the room and throughout our encounter. Protective equipment was worn throughout this patient encounter including a face mask, eye protection,  and gloves. Hand hygiene was performed before donning protective equipment and after removal when leaving the room.     Dloores Shepherd, CESIA  Family Medicine  [facility] [place]  12/28/20  18:03 EST

## 2021-01-10 DIAGNOSIS — I10 ESSENTIAL HYPERTENSION: ICD-10-CM

## 2021-01-11 RX ORDER — METOPROLOL SUCCINATE 25 MG/1
50 TABLET, EXTENDED RELEASE ORAL DAILY
Qty: 90 TABLET | Refills: 1 | Status: SHIPPED | OUTPATIENT
Start: 2021-01-11 | End: 2021-03-15 | Stop reason: SDUPTHER

## 2021-01-14 NOTE — PROGRESS NOTES
RM:________    Referral Provider: Joao, CESIA Granger Head, CESIA Granger    NEW PATIENT/ CONSULT  PREVIOUS CARDIOLOGIST:   CARDIAC TESTING:     : 1975   AGE: 45 y.o.    2021  REASON FOR VISIT/  CC:      WT: ____________ BP: __________L __________R/ HR_______________    CHEST PAIN: _____________    SOA: ____________PALPS: __________      LIGHTHEADED: ___________ FATIGUE: _______________ EDEMA______________  ALLERGIES:  Lisinopril, Oxaprozin, Propoxyphene, and Tolmetin  SMOKING HISTORY  Social History     Tobacco Use   • Smoking status: Never Smoker   • Smokeless tobacco: Never Used   Substance Use Topics   • Alcohol use: No     Frequency: Never   • Drug use: No          CHILDREN: _______________________       CAFFEINE USE________  ALCOHOL _____________  OCCUPATION_____________  Past Surgical History:   Procedure Laterality Date   • LASIK Bilateral                 FAMILY HISTORY  HEART DISEASE  CHF  DIABETES  CARDIAC ARREST  STROKE  CANCER  ANEURYSM                                                             H/O: MI_____   STROKE________   GOUT_____   ANEMIA______     CAROTID________ HIV____ CAD_______ HYPERCHOL _____    H/O: CHF _____   RF____ DM___ HTN_______PVD____THYROID DISEASE_______    PMH: GI ____   HEPATITIS ___ KIDNEY DISEASE ___ LUNG DISEASE _______     SLEEP APNEA ____ BLOOD CLOTS ____ DVT ____ VEIN STRIPPING ___________     CANCER _________________________________ CHEMO/ RADIATION__________

## 2021-01-26 ENCOUNTER — OFFICE VISIT (OUTPATIENT)
Dept: CARDIOLOGY | Facility: CLINIC | Age: 46
End: 2021-01-26

## 2021-01-26 VITALS
HEART RATE: 89 BPM | WEIGHT: 219.2 LBS | BODY MASS INDEX: 34.4 KG/M2 | SYSTOLIC BLOOD PRESSURE: 116 MMHG | DIASTOLIC BLOOD PRESSURE: 78 MMHG | HEIGHT: 67 IN

## 2021-01-26 DIAGNOSIS — I10 ESSENTIAL HYPERTENSION: Primary | ICD-10-CM

## 2021-01-26 DIAGNOSIS — Z84.89 FAMILY HISTORY OF SUDDEN DEATH IN MOTHER: ICD-10-CM

## 2021-01-26 PROBLEM — E05.90 HYPERTHYROIDISM: Status: RESOLVED | Noted: 2019-06-05 | Resolved: 2021-01-26

## 2021-01-26 PROCEDURE — 99244 OFF/OP CNSLTJ NEW/EST MOD 40: CPT | Performed by: INTERNAL MEDICINE

## 2021-01-26 PROCEDURE — 93000 ELECTROCARDIOGRAM COMPLETE: CPT | Performed by: INTERNAL MEDICINE

## 2021-01-26 NOTE — PROGRESS NOTES
"Date of Office Visit: 21  Encounter Provider: Demarcus Fontenot MD  Place of Service: [facility] CARDIOLOGY  Patient Name: Nacho Schwartz  :1975    Chief Complaint   Patient presents with   • Hypertension   :     HPI:     Ms. Schwartz is 45 y.o. and presents today in consultation for hypertension at the request of CESIA Landin.    She had been on metoprolol intermittently in her life for hyperthyroidism, not for hypertension.  Approximately three years ago, her BP started to creep up.  She was placed on lisinopril but had a reaction.  Because she had previously tolerated metoprolol well, she asked to be placed back on it.  She did fine with this until recently.  Her BP was again increasing, so she was placed on losartan.  Then the dose of both metoprolol and losartan were increased and HCTZ was added.      She is under a tremendous amount of stress.  She has two young children who were in [employer], which was very stressful.  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  Thankfully, she just found out yesterday that she is getting a new job, and her children have been able to go back to school.     Her mother  suddenly at age 48. She had a history of meningitis as a young child and was seriously ill but recovered. She was a healthy young woman.  She collapsed in the shower and was briefly revived with CPR at home and when EMS arrived she was in PEA. An autopsy was performed but she does not have the report; she was told that she did have some \"fibrosis in her heart.\"     She has not been a good sleeper for many years. She doesn't think she snores.  She has never had a sleep study.     Past Medical History:   Diagnosis Date   • Graves' disease in remission    • Hypertension    • Obesity        Past Surgical History:   Procedure Laterality Date   • LASIK Bilateral        Social History     Socioeconomic History   • Marital status:      " "Spouse name: Not on file   • Number of children: 2   • Years of education: Not on file   • Highest education level: Not on file   Occupational History   • Occupation: Nurse Manager   Tobacco Use   • Smoking status: Never Smoker   • Smokeless tobacco: Never Used   Substance and Sexual Activity   • Alcohol use: No     Frequency: Never   • Drug use: No   • Sexual activity: Yes     Partners: Male     Birth control/protection: Partner's vasectomy       Family History   Problem Relation Age of Onset   • Heart disease Mother    • Sudden death Mother 48   • Hypertension Father    • Diabetes Father    • No Known Problems Sister    • Cancer Maternal Aunt    • No Known Problems Sister    • No Known Problems Sister        Review of Systems   Constitution: Positive for malaise/fatigue.   Psychiatric/Behavioral: The patient has insomnia and is nervous/anxious.    All other systems reviewed and are negative.      Allergies   Allergen Reactions   • Lisinopril Cough     SEVERE COUGH  SEVERE COUGH     • Oxaprozin Dizziness   • Propoxyphene Unknown (See Comments)   • Tolmetin Other (See Comments)         Current Outpatient Medications:   •  hydroCHLOROthiazide (HYDRODIURIL) 12.5 MG tablet, Take 1 tablet by mouth Daily., Disp: 30 tablet, Rfl: 2  •  losartan (COZAAR) 100 MG tablet, Take 1 tablet by mouth Daily., Disp: 30 tablet, Rfl: 2  •  metoprolol succinate XL (TOPROL-XL) 25 MG 24 hr tablet, Take 2 tablets by mouth Daily., Disp: 90 tablet, Rfl: 1  •  Multiple Vitamins-Minerals (MULTIVITAMIN ADULT PO), multivitamin, Disp: , Rfl:       Objective:     Vitals:    01/26/21 1414   BP: 116/78   BP Location: Right arm   Pulse: 89   Weight: 99.4 kg (219 lb 3.2 oz)   Height: 168.9 cm (66.5\")     Body mass index is 34.85 kg/m².    Vitals signs reviewed.   Constitutional:       Appearance: Healthy appearance. Well-developed and not in distress.   Eyes:      Conjunctiva/sclera: Conjunctivae normal.   HENT:      Head: Normocephalic.      Nose: " Nose normal.         Comments: Masked  Neck:      Musculoskeletal: Normal range of motion.      Vascular: No JVD. JVD normal.      Lymphadenopathy: No cervical adenopathy.   Pulmonary:      Effort: Pulmonary effort is normal.      Breath sounds: Normal breath sounds.   Cardiovascular:      Normal rate. Regular rhythm.      Murmurs: There is no murmur.   Pulses:     Intact distal pulses.   Abdominal:      Palpations: Abdomen is soft.      Tenderness: There is no abdominal tenderness.   Musculoskeletal: Normal range of motion.   Skin:     General: Skin is warm and dry.      Findings: No rash.   Neurological:      General: No focal deficit present.      Mental Status: Alert, oriented to person, place, and time and oriented to person, place and time.      Cranial Nerves: No cranial nerve deficit.   Psychiatric:         Behavior: Behavior normal.         Thought Content: Thought content normal.         Judgment: Judgment normal.         ECG 12 Lead    Date/Time: 1/26/2021 2:50 PM  Performed by: Demarcus Fontenot MD  Authorized by: Demarcus Fontenot MD   Comparison: compared with previous ECG   Similar to previous ECG  Rhythm: sinus rhythm  Conduction: conduction normal  Q waves: III    ST Segments: ST segments normal  T Waves: T waves normal  QRS axis: normal  Other: no other findings    Clinical impression: normal ECG            Assessment:       Diagnosis Plan   1. Essential hypertension     2. Family history of sudden death in mother          Plan:       Mrs Schwartz has had elevated blood pressure for a few years.  Over the last few months, it really went up.  She has been under a tremendous amount of stress and hasn't been taking care of herself.  She has not been exercising or eating right.  She's not sleeping well at all.      Thankfully her kids are back in school and she will be getting a new job.  She would like to focus on self care more.  Her BP is perfect today, and I'm hopeful it will continue to improve.  I would  keep her on HCTZ as I feel it's a great choice in women.  If her BP comes down, the losartan could be decreased.  I think the beta blocker is helpful for her because she has so much baseline stress.    She may benefit from a sleep medicine evaluation, as her chronic insomnia is likely contributing as well.    She and I talked about her mother.  It's unclear why she .  It sounds like she was young and active (walked 2-3 miles daily) and had no risk factors for CAD, but possibly had myocardial fibrosis at autopsy.  I encouraged her to be aggressive about her cardiac risk factors (weight, stress, blood pressure, and lipids).     I don't feel she needs any cardiac testing.    Sincerely,       Demarcus Fontenot MD

## 2021-03-15 DIAGNOSIS — I10 UNCONTROLLED HYPERTENSION: ICD-10-CM

## 2021-03-15 DIAGNOSIS — I10 ESSENTIAL HYPERTENSION: ICD-10-CM

## 2021-03-16 RX ORDER — METOPROLOL SUCCINATE 25 MG/1
50 TABLET, EXTENDED RELEASE ORAL DAILY
Qty: 90 TABLET | Refills: 1 | Status: SHIPPED | OUTPATIENT
Start: 2021-03-16 | End: 2021-10-11

## 2021-03-16 RX ORDER — LOSARTAN POTASSIUM 100 MG/1
100 TABLET ORAL DAILY
Qty: 90 TABLET | Refills: 1 | Status: SHIPPED | OUTPATIENT
Start: 2021-03-16 | End: 2021-09-03 | Stop reason: DRUGHIGH

## 2021-03-16 RX ORDER — HYDROCHLOROTHIAZIDE 12.5 MG/1
12.5 TABLET ORAL DAILY
Qty: 90 TABLET | Refills: 1 | Status: SHIPPED | OUTPATIENT
Start: 2021-03-16 | End: 2021-09-10

## 2021-09-03 DIAGNOSIS — I10 ESSENTIAL HYPERTENSION: Primary | ICD-10-CM

## 2021-09-03 RX ORDER — LOSARTAN POTASSIUM 50 MG/1
50 TABLET ORAL DAILY
Qty: 90 TABLET | Refills: 1 | Status: SHIPPED | OUTPATIENT
Start: 2021-09-03 | End: 2021-10-11

## 2021-09-10 DIAGNOSIS — I10 UNCONTROLLED HYPERTENSION: ICD-10-CM

## 2021-09-10 RX ORDER — HYDROCHLOROTHIAZIDE 12.5 MG/1
TABLET ORAL
Qty: 90 TABLET | Refills: 1 | Status: SHIPPED | OUTPATIENT
Start: 2021-09-10 | End: 2022-05-10

## 2021-09-10 RX ORDER — LOSARTAN POTASSIUM 100 MG/1
TABLET ORAL
Qty: 90 TABLET | Refills: 1 | OUTPATIENT
Start: 2021-09-10

## 2021-09-14 ENCOUNTER — CLINICAL SUPPORT (OUTPATIENT)
Dept: FAMILY MEDICINE CLINIC | Facility: CLINIC | Age: 46
End: 2021-09-14

## 2021-09-14 ENCOUNTER — TELEPHONE (OUTPATIENT)
Dept: FAMILY MEDICINE CLINIC | Facility: CLINIC | Age: 46
End: 2021-09-14

## 2021-09-14 VITALS — SYSTOLIC BLOOD PRESSURE: 120 MMHG | DIASTOLIC BLOOD PRESSURE: 90 MMHG

## 2021-09-14 NOTE — TELEPHONE ENCOUNTER
Pt came in for BP check.     BP read 120/90    She will also be sending a [employer] message in the next few days with her at home readings. She stated that she had adjusted her medication several times and then stopped it. She has an appointment with you next month.

## 2021-10-07 DIAGNOSIS — I10 ESSENTIAL HYPERTENSION: ICD-10-CM

## 2021-10-07 DIAGNOSIS — E05.00 GRAVES' DISEASE IN REMISSION: ICD-10-CM

## 2021-10-07 DIAGNOSIS — Z00.00 ANNUAL PHYSICAL EXAM: Primary | ICD-10-CM

## 2021-10-07 DIAGNOSIS — Z00.00 ANNUAL PHYSICAL EXAM: ICD-10-CM

## 2021-10-11 ENCOUNTER — OFFICE VISIT (OUTPATIENT)
Dept: FAMILY MEDICINE CLINIC | Facility: CLINIC | Age: 46
End: 2021-10-11

## 2021-10-11 VITALS
RESPIRATION RATE: 16 BRPM | WEIGHT: 181 LBS | DIASTOLIC BLOOD PRESSURE: 74 MMHG | HEART RATE: 68 BPM | TEMPERATURE: 97.5 F | BODY MASS INDEX: 28.41 KG/M2 | OXYGEN SATURATION: 99 % | SYSTOLIC BLOOD PRESSURE: 110 MMHG | HEIGHT: 67 IN

## 2021-10-11 DIAGNOSIS — Z12.11 COLON CANCER SCREENING: ICD-10-CM

## 2021-10-11 DIAGNOSIS — Z00.00 ANNUAL PHYSICAL EXAM: Primary | ICD-10-CM

## 2021-10-11 DIAGNOSIS — E05.00 GRAVES' DISEASE IN REMISSION: ICD-10-CM

## 2021-10-11 DIAGNOSIS — I10 ESSENTIAL HYPERTENSION: ICD-10-CM

## 2021-10-11 PROCEDURE — 99396 PREV VISIT EST AGE 40-64: CPT | Performed by: NURSE PRACTITIONER

## 2021-10-11 RX ORDER — LOSARTAN POTASSIUM 50 MG/1
25 TABLET ORAL DAILY
Qty: 90 TABLET | Refills: 1
Start: 2021-10-11 | End: 2022-08-01

## 2021-10-11 NOTE — PROGRESS NOTES
Chief Complaint   Patient presents with   • Annual Exam       Patient Care Team:  Head, CESIA Granger as PCP - General (Nurse Practitioner)    Subjective   Nacho Schwartz is a 46 y.o. female and is here for a yearly physical exam. The patient reports no problems.    She has had intentional weight loss of around 37 pounds since December.      Do you take any herbs or supplements that were not prescribed by a doctor? yes. If so, these will be added to active medication list.    Health Habits:  Dental Exam: Up to date  Eye Exam: Up to date  Diet: Portion control diet.    Exercise: Little   Current exercise activities include: walking    Pap:  2/2021 normal  Followed by Dr. Herrera.  Periods becoming irregular.    Mammogram: 2/2021 normal    Dexa: N/A    Colonoscopy: Never    The following portions of the patient's history were reviewed and updated as appropriate: allergies, current medications, past family history, past medical history, past social history, past surgical history and problem list.    Social and Family and Surgical History reviewed and updated today, see Rooming tab.    Health History, Preventive Measures and Vaccination flow sheets reviewed and updated today.    Patient's current medical chart in Epic; including previous office notes, imaging, labs, specialist's evaluation either in notes or in Media tab reviewed today.    Other pertinent medical information also reviewed thru Care Everywhere function is also reviewed today.    Review of Systems  Review of Systems   Constitutional: Negative.    HENT: Negative.    Respiratory: Negative.    Cardiovascular: Negative.    Gastrointestinal: Negative.    Endocrine: Negative.    Genitourinary: Negative.    Musculoskeletal: Negative.    Skin: Negative.    Neurological: Negative.    Hematological: Negative.    Psychiatric/Behavioral: Negative.      Vitals:    10/11/21 0832   BP: 110/74   BP Location: Left arm   Patient Position: Sitting   Cuff Size: Adult   Pulse:  "68   Resp: 16   Temp: 97.5 °F (36.4 °C)   SpO2: 99%   Weight: 82.1 kg (181 lb)   Height: 168.9 cm (66.5\")       General Appearance:  Alert, cooperative, no distress, appears stated age   Head:  Normocephalic, without obvious abnormality, atraumatic   Eyes:  PERRL, conjunctiva/corneas clear, EOM's intact.   Ears:  Normal TM's and external ear canals, both ears   Nose: Nares normal, septum midline, mucosa normal, no drainage or sinus tenderness   Throat: Lips, mucosa, and tongue normal; teeth and gums normal   Neck: Supple, symmetrical, trachea midline, no adenopathy;   thyroid: No enlargement/tenderness/nodules       Lungs:  Clear to auscultation bilaterally, respirations even and unlabored   Chest wall:  No tenderness or deformity   Heart:  Regular rate and rhythm, S1 and S2 normal, no murmur, rub or gallop   Abdomen:  Soft, non-tender, bowel sounds active all four quadrants,   no masses, no organomegaly           Extremities: Extremities normal, atraumatic, no cyanosis or edema   Pulses: 2+ and symmetric all extremities   Skin: Skin color, texture, turgor normal, no rashes or lesions   Lymph nodes: Cervical, supraclavicular, and axillary nodes normal   Neurologic: CNII-XII intact. Normal strength, sensation and reflexes   throughout       No results found for this or any previous visit.  Assessment/Plan   Healthy female exam.  Diagnoses and all orders for this visit:    1. Annual physical exam (Primary)  -     CBC & Differential; Future  -     Comprehensive Metabolic Panel; Future  -     Lipid Panel; Future  -     TSH; Future    2. Colon cancer screening  -     Ambulatory Referral For Screening Colonoscopy    3. Essential hypertension  -     losartan (Cozaar) 50 MG tablet; Take 0.5 tablets by mouth Daily.  Dispense: 90 tablet; Refill: 1  -     CBC & Differential; Future  -     Comprehensive Metabolic Panel; Future  -     Lipid Panel; Future    4. Graves' disease in remission  -     TSH; Future  -     Thyroid " Peroxidase Antibody; Future  -     T3, Free; Future  -     T4, Free; Future      1. Nacho Schwartz has been doing well since she was last seen.  Reviewed recent labs normal patient which all appear stable except for continued elevated cholesterol levels.  Advised continue to watch diet and incorporate exercise into daily routine.  She will start watching her diet back in May.  Advised can take some time before noticing improvement.  Also could be elevated due to family history.  Also with her history of Graves' disease can cause problems with cholesterol levels.  Concerning her hypertension: Current medication regimen losartan 50 mg daily along with hydrochlorothiazide 12.5 mg daily.  At 1 point she was taking Toprol-XL which she discontinued due to blood pressure stabilized and weight loss.  Her blood pressure cuff at home is old.  She plans to obtain a new one in the near future.  Blood pressure stable at 110/74.  States at times she does become lightheaded.  Instructed to take half of her losartan daily.  She is to monitor blood pressure at home and keep a log.  She is let us know if blood pressure starts running greater than 140/90 or less than 100/60.  Otherwise all labs are stable, we will have her return office in 1 year for next annual physical exam with fasting labs.  In the meantime instructed contact us sooner for any problems or concerns.    2. Patient Counseling:  --Nutrition: Stressed importance of moderation in sodium/caffeine intake,      saturated fat and cholesterol.  Discussed caloric balance, sufficient intake of fresh fruits, vegetables, fiber, calcium, iron.  --Discussed the new recommendation against daily use of baby aspirin for primary prevention in low risk patients.  --Exercise: Stressed the importance of regular exercise.   --Substance Abuse: Discussed cessation/primary prevention of tobacco, alcohol,   or other drug use; driving or other dangerous activities under the influence.     --Dental health: Discussed importance of regular tooth brushing, flossing, and       dental visits.  --Suggested having eyes and vision checked if needed or past due.  --Immunizations reviewed.  --Discussed benefits of screening colonoscopy.  3. Discussed the patient's BMI with her.  The BMI is in the acceptable range  4. Follow up next physical in 1 year    Patient was given instructions and counseling regarding condition or for health maintenance advice.  Please see specific information pulled into the AVS if appropriate.      Medications Discontinued During This Encounter   Medication Reason   • metoprolol succinate XL (TOPROL-XL) 25 MG 24 hr tablet *Therapy completed   • losartan (Cozaar) 50 MG tablet           Patient was wearing facemask when I entered the room and throughout our encounter. Protective equipment was worn throughout this patient encounter including a face mask and gloves. Hand hygiene was performed before donning protective equipment and after removal when leaving the room.     Dolores Shepherd, CESIA  Family Practice  [facility] [place]

## 2021-10-11 NOTE — PATIENT INSTRUCTIONS

## 2021-10-19 ENCOUNTER — TELEPHONE (OUTPATIENT)
Dept: GASTROENTEROLOGY | Facility: CLINIC | Age: 46
End: 2021-10-19

## 2021-11-24 ENCOUNTER — TELEPHONE (OUTPATIENT)
Dept: GASTROENTEROLOGY | Facility: CLINIC | Age: 46
End: 2021-11-24

## 2021-12-03 ENCOUNTER — PREP FOR SURGERY (OUTPATIENT)
Dept: SURGERY | Facility: SURGERY CENTER | Age: 46
End: 2021-12-03

## 2021-12-03 DIAGNOSIS — Z01.818 OTHER SPECIFIED PRE-OPERATIVE EXAMINATION: ICD-10-CM

## 2021-12-03 DIAGNOSIS — Z83.71 FAMILY HX COLONIC POLYPS: Primary | ICD-10-CM

## 2021-12-03 DIAGNOSIS — Z12.11 SCREENING FOR MALIGNANT NEOPLASM OF COLON: ICD-10-CM

## 2021-12-15 NOTE — TELEPHONE ENCOUNTER
Return call from patient.  Scheduled at [facility] on 03/15/2022 at 2pm - arrive 1pm.  Will mail instructions.

## 2021-12-23 ENCOUNTER — PREP FOR SURGERY (OUTPATIENT)
Dept: SURGERY | Facility: SURGERY CENTER | Age: 46
End: 2021-12-23

## 2021-12-23 DIAGNOSIS — Z83.71 FAMILY HISTORY OF COLONIC POLYPS: ICD-10-CM

## 2021-12-23 DIAGNOSIS — Z12.11 SCREEN FOR COLON CANCER: Primary | ICD-10-CM

## 2021-12-27 PROBLEM — Z12.11 SCREEN FOR COLON CANCER: Status: ACTIVE | Noted: 2021-12-27

## 2021-12-27 PROBLEM — Z83.719 FAMILY HISTORY OF COLONIC POLYPS: Status: ACTIVE | Noted: 2021-12-27

## 2021-12-27 PROBLEM — Z83.71 FAMILY HISTORY OF COLONIC POLYPS: Status: ACTIVE | Noted: 2021-12-27

## 2022-03-15 ENCOUNTER — ANESTHESIA EVENT (OUTPATIENT)
Dept: SURGERY | Facility: SURGERY CENTER | Age: 47
End: 2022-03-15

## 2022-03-15 ENCOUNTER — HOSPITAL ENCOUNTER (OUTPATIENT)
Facility: SURGERY CENTER | Age: 47
Setting detail: HOSPITAL OUTPATIENT SURGERY
Discharge: HOME | End: 2022-03-15
Attending: INTERNAL MEDICINE | Admitting: INTERNAL MEDICINE

## 2022-03-15 ENCOUNTER — ANESTHESIA (OUTPATIENT)
Dept: SURGERY | Facility: SURGERY CENTER | Age: 47
End: 2022-03-15

## 2022-03-15 VITALS
HEART RATE: 72 BPM | OXYGEN SATURATION: 95 % | DIASTOLIC BLOOD PRESSURE: 79 MMHG | TEMPERATURE: 98 F | SYSTOLIC BLOOD PRESSURE: 134 MMHG | BODY MASS INDEX: 28.95 KG/M2 | HEIGHT: 68 IN | RESPIRATION RATE: 16 BRPM | WEIGHT: 191 LBS

## 2022-03-15 DIAGNOSIS — Z12.11 SCREEN FOR COLON CANCER: ICD-10-CM

## 2022-03-15 DIAGNOSIS — Z83.71 FAMILY HISTORY OF COLONIC POLYPS: ICD-10-CM

## 2022-03-15 LAB
B-HCG UR QL: NEGATIVE
EXPIRATION DATE: NORMAL
INTERNAL NEGATIVE CONTROL: NORMAL
INTERNAL POSITIVE CONTROL: NORMAL
Lab: NORMAL

## 2022-03-15 PROCEDURE — 88305 TISSUE EXAM BY PATHOLOGIST: CPT | Performed by: INTERNAL MEDICINE

## 2022-03-15 PROCEDURE — 45380 COLONOSCOPY AND BIOPSY: CPT | Performed by: INTERNAL MEDICINE

## 2022-03-15 PROCEDURE — 81025 URINE PREGNANCY TEST: CPT | Performed by: INTERNAL MEDICINE

## 2022-03-15 PROCEDURE — 25010000002 PROPOFOL 10 MG/ML EMULSION: Performed by: ANESTHESIOLOGY

## 2022-03-15 RX ORDER — LIDOCAINE HYDROCHLORIDE 10 MG/ML
0.5 INJECTION, SOLUTION INFILTRATION; PERINEURAL ONCE AS NEEDED
Status: DISCONTINUED | OUTPATIENT
Start: 2022-03-15 | End: 2022-03-15 | Stop reason: HOSPADM

## 2022-03-15 RX ORDER — MAGNESIUM HYDROXIDE 1200 MG/15ML
LIQUID ORAL AS NEEDED
Status: DISCONTINUED | OUTPATIENT
Start: 2022-03-15 | End: 2022-03-15 | Stop reason: HOSPADM

## 2022-03-15 RX ORDER — SODIUM CHLORIDE, SODIUM LACTATE, POTASSIUM CHLORIDE, CALCIUM CHLORIDE 600; 310; 30; 20 MG/100ML; MG/100ML; MG/100ML; MG/100ML
1000 INJECTION, SOLUTION INTRAVENOUS CONTINUOUS
Status: DISCONTINUED | OUTPATIENT
Start: 2022-03-15 | End: 2022-03-15 | Stop reason: HOSPADM

## 2022-03-15 RX ORDER — DIPHENHYDRAMINE HCL 25 MG
25 TABLET ORAL
Status: DISCONTINUED | OUTPATIENT
Start: 2022-03-15 | End: 2022-03-15 | Stop reason: HOSPADM

## 2022-03-15 RX ORDER — SODIUM CHLORIDE 0.9 % (FLUSH) 0.9 %
10 SYRINGE (ML) INJECTION AS NEEDED
Status: DISCONTINUED | OUTPATIENT
Start: 2022-03-15 | End: 2022-03-15 | Stop reason: HOSPADM

## 2022-03-15 RX ORDER — FLUMAZENIL 0.1 MG/ML
0.2 INJECTION INTRAVENOUS AS NEEDED
Status: DISCONTINUED | OUTPATIENT
Start: 2022-03-15 | End: 2022-03-15 | Stop reason: HOSPADM

## 2022-03-15 RX ORDER — DIPHENHYDRAMINE HYDROCHLORIDE 50 MG/ML
12.5 INJECTION INTRAMUSCULAR; INTRAVENOUS
Status: DISCONTINUED | OUTPATIENT
Start: 2022-03-15 | End: 2022-03-15 | Stop reason: HOSPADM

## 2022-03-15 RX ORDER — LIDOCAINE HYDROCHLORIDE 20 MG/ML
INJECTION, SOLUTION INFILTRATION; PERINEURAL AS NEEDED
Status: DISCONTINUED | OUTPATIENT
Start: 2022-03-15 | End: 2022-03-15 | Stop reason: SURG

## 2022-03-15 RX ORDER — PROPOFOL 10 MG/ML
VIAL (ML) INTRAVENOUS AS NEEDED
Status: DISCONTINUED | OUTPATIENT
Start: 2022-03-15 | End: 2022-03-15 | Stop reason: SURG

## 2022-03-15 RX ORDER — NALOXONE HCL 0.4 MG/ML
0.2 VIAL (ML) INJECTION AS NEEDED
Status: DISCONTINUED | OUTPATIENT
Start: 2022-03-15 | End: 2022-03-15 | Stop reason: HOSPADM

## 2022-03-15 RX ORDER — FENTANYL CITRATE 50 UG/ML
50 INJECTION, SOLUTION INTRAMUSCULAR; INTRAVENOUS
Status: DISCONTINUED | OUTPATIENT
Start: 2022-03-15 | End: 2022-03-15 | Stop reason: HOSPADM

## 2022-03-15 RX ADMIN — GLYCOPYRROLATE 0.2 MG: 0.2 INJECTION, SOLUTION INTRAMUSCULAR; INTRAVITREAL at 14:31

## 2022-03-15 RX ADMIN — PROPOFOL 130 MG: 10 INJECTION, EMULSION INTRAVENOUS at 14:31

## 2022-03-15 RX ADMIN — PROPOFOL 180 MCG/KG/MIN: 10 INJECTION, EMULSION INTRAVENOUS at 14:31

## 2022-03-15 RX ADMIN — SODIUM CHLORIDE, POTASSIUM CHLORIDE, SODIUM LACTATE AND CALCIUM CHLORIDE 1000 ML: 600; 310; 30; 20 INJECTION, SOLUTION INTRAVENOUS at 13:30

## 2022-03-15 RX ADMIN — LIDOCAINE HYDROCHLORIDE 40 MG: 20 INJECTION, SOLUTION INFILTRATION; PERINEURAL at 14:31

## 2022-03-15 NOTE — BRIEF OP NOTE
COLONOSCOPY  Progress Note    Nacho Schwartz  3/15/2022    Pre-op Diagnosis:   Screen for colon cancer [Z12.11]  Family history of colonic polyps [Z83.71]       Post-Op Diagnosis Codes:     * Screen for colon cancer [Z12.11]     * Family history of colonic polyps [Z83.71]     * Colon polyp [K63.5]    Procedure/CPT® Codes:        Procedure(s):  COLONOSCOPY WITH POLYPECTOMY    Surgeon(s):  Noman Olmedo MD    Anesthesia: Monitored Anesthesia Care    Staff:   Endo Technician: Vikram Lopez  Endo Nurse: Bev Castro RN; Laxmi Arenas RN         Estimated Blood Loss: none    Urine Voided: * No values recorded between 3/15/2022  2:26 PM and 3/15/2022  2:55 PM *    Specimens:                Specimens     ID Source Type Tests Collected By Collected At Frozen?    A Large Intestine, Sigmoid Colon Polyp · TISSUE PATHOLOGY EXAM   Noman Olmedo MD 3/15/22 5404 No    Description: sigmoid colon polyp    Comment: sigmoid colon polyp    This specimen was not marked as sent.                Drains: * No LDAs found *    Findings: Colon to TI good Prep  Polyp-Biopsy    Complications: None          Noman Olmedo MD     Date: 3/15/2022  Time: 14:57 EDT

## 2022-03-15 NOTE — DISCHARGE INSTRUCTIONS
Monitored Anesthesia Care, Care After  These instructions provide you with information about caring for yourself after your procedure. Your health care provider may also give you more specific instructions. Your treatment has been planned according to current medical practices, but problems sometimes occur. Call your health care provider if you have any problems or questions after your procedure.  What can I expect after the procedure?  After your procedure, you may:  Feel sleepy for several hours.  Feel clumsy and have poor balance for several hours.  Feel forgetful about what happened after the procedure.  Have poor judgment for several hours.  Feel nauseous or vomit.  Have a sore throat if you had a breathing tube during the procedure.  Follow these instructions at home:    *****************NO DRIVING TODAY****************************    For at least 24 hours after the procedure:             Have a responsible adult stay with you. It is important to have someone help care for you until you are awake and alert.  Rest as needed.  Do not:  Participate in activities in which you could fall or become injured.  Use heavy machinery.  Drink alcohol.  Take sleeping pills or medicines that cause drowsiness.  Make important decisions or sign legal documents.  Take care of children on your own.  Eating and drinking  Follow the diet that is recommended by your health care provider.  If you vomit, drink water, juice, or soup when you can drink without vomiting.  Make sure you have little or no nausea before eating solid foods.  General instructions  Take over-the-counter and prescription medicines only as told by your health care provider.  If you have sleep apnea, surgery and certain medicines can increase your risk for breathing problems. Follow instructions from your health care provider about wearing your sleep device:  Anytime you are sleeping, including during daytime naps.  While taking prescription pain medicines,  sleeping medicines, or medicines that make you drowsy.  If you smoke, do not smoke without supervision.  Keep all follow-up visits as told by your health care provider. This is important.  Contact a health care provider if:  You keep feeling nauseous or you keep vomiting.  You feel light-headed.  You develop a rash.  You have a fever.  Get help right away if:  You have trouble breathing.  Summary  For several hours after your procedure, you may feel sleepy and have poor judgment.  Have a responsible adult stay with you for at least 24 hours or until you are awake and alert.  This information is not intended to replace advice given to you by your health care provider. Make sure you discuss any questions you have with your health care provider.  Document Released: 04/09/2017 Document Revised: 08/03/2018 Document Reviewed: 04/09/2017  [employer] Interactive Patient Education © 2019 [employer]    Colonoscopy, Adult, Care After  This sheet gives you information about how to care for yourself after your procedure. Your doctor may also give you more specific instructions. If you have problems or questions, call your doctor.  What can I expect after the procedure?  After the procedure, it is common to have:  A small amount of blood in your poop for 24 hours.  Some gas.  Mild cramping or bloating in your belly.  Follow these instructions at home:  General instructions    ***************DO NOT DRIVE TODAY*******************    For the first 24 hours after the procedure:  Do not sign important documents.  Do not drink alcohol.  Do your daily activities more slowly than normal.  Eat foods that are soft and easy to digest.  Take over-the-counter or prescription medicines only as told by your doctor.  To help cramping and bloating:       Try walking around.  Put heat on your belly (abdomen) as told by your doctor. Use a heat source that your doctor recommends, such as a moist heat pack or a heating pad.  Put a towel between your  skin and the heat source.  Leave the heat on for 20-30 minutes.  Remove the heat if your skin turns bright red. This is especially important if you cannot feel pain, heat, or cold. You can get burned.  Eating and drinking

## 2022-03-15 NOTE — H&P
"Patient Care Team:  Head, CESIA Granger as PCP - General (Nurse Practitioner)    CHIEF COMPLAINT: Screening CRC and Family hx of CRC or polyps    HISTORY OF PRESENT ILLNESS:  First exam    Past Medical History:   Diagnosis Date   • Graves' disease in remission    • Hypertension    • Obesity    • PONV (postoperative nausea and vomiting)      Past Surgical History:   Procedure Laterality Date   • DENTAL PROCEDURE     • LASIK Bilateral 2004     Family History   Problem Relation Age of Onset   • Heart disease Mother    • Sudden death Mother 48   • Hypertension Father    • Diabetes Father    • No Known Problems Sister    • Cancer Maternal Aunt    • No Known Problems Sister    • No Known Problems Sister      Social History     Tobacco Use   • Smoking status: Never Smoker   • Smokeless tobacco: Never Used   Vaping Use   • Vaping Use: Never used   Substance Use Topics   • Alcohol use: No   • Drug use: No     Medications Prior to Admission   Medication Sig Dispense Refill Last Dose   • losartan (Cozaar) 50 MG tablet Take 0.5 tablets by mouth Daily. 90 tablet 1 3/14/2022 at Unknown time   • hydroCHLOROthiazide (HYDRODIURIL) 12.5 MG tablet TAKE 1 TABLET BY MOUTH EVERY DAY 90 tablet 1 3/13/2022   • Multiple Vitamins-Minerals (MULTIVITAMIN ADULT PO) multivitamin   3/8/2022     Allergies:  Lisinopril, Oxaprozin, Propoxyphene, and Tolmetin    REVIEW OF SYSTEMS:  Please see the above history of present illness for pertinent positives and negatives.  The remainder of the patient's systems have been reviewed and are negative.     Vital Signs  Temp:  [98.4 °F (36.9 °C)] 98.4 °F (36.9 °C)  Heart Rate:  [85] 85  Resp:  [16] 16  BP: (142)/(88) 142/88    Flowsheet Rows    Flowsheet Row First Filed Value   Admission Height 172.7 cm (68\") Documented at 03/11/2022 1344   Admission Weight 86.2 kg (190 lb) Documented at 03/11/2022 1344           Physical Exam:  Physical Exam   Constitutional: Patient appears well-developed and well-nourished and " in no acute distress   HEENT:   Head: Normocephalic and atraumatic.   Eyes:  Pupils are equal, round, and reactive to light. EOM are intact. Sclerae are anicteric and non-injected.  Mouth and Throat: Patient has moist mucous membranes. Oropharynx is clear of any erythema or exudate.     Neck: Neck supple. No JVD present. No thyromegaly present. No lymphadenopathy present.  Cardiovascular: Regular rate, regular rhythm, S1 normal and S2 normal.  Exam reveals no gallop and no friction rub.  No murmur heard.  Pulmonary/Chest: Lungs are clear to auscultation bilaterally. No respiratory distress. No wheezes. No rhonchi. No rales.   Abdominal: Soft. Bowel sounds are normal. No distension and no mass. There is no hepatosplenomegaly. There is no tenderness.   Musculoskeletal: Normal Muscle tone  Extremities: No edema. Pulses are palpable in all 4 extremities.  Neurological: Patient is alert and oriented to person, place, and time. Cranial nerves II-XII are grossly intact with no focal deficits.  Skin: Skin is warm. No rash noted. Nails show no clubbing.  No cyanosis or erythema.    Debilities/Disabilities Identified: None  Emotional Behavior: Appropriate     Results Review:   I reviewed the patient's new clinical results.    Lab Results (most recent)     Procedure Component Value Units Date/Time    POC Urine Pregnancy [[id number]] Collected: 03/15/22 1318    Specimen: Urine Updated: 03/15/22 1319     HCG, Urine, QL Negative     Lot Number [id number]     Internal Positive Control Passed     Internal Negative Control Passed     Expiration Date 04-          Imaging Results (Most Recent)     None        reviewed    ECG/EMG Results (most recent)     None        reviewed    Assessment/Plan   Screening CRC and Family hx of CRC or polyps/  colonoscopy      I discussed the patient's findings and my recommendations with patient.     Noman Olmedo MD  03/15/22  13:55 EDT    Time: 10 min prior to procedure.

## 2022-03-15 NOTE — ANESTHESIA PREPROCEDURE EVALUATION
Anesthesia Evaluation     Patient summary reviewed and Nursing notes reviewed   history of anesthetic complications: PONV  NPO Solid Status: > 8 hours  NPO Liquid Status: > 2 hours           Airway   Mallampati: II  TM distance: >3 FB  Neck ROM: full  no difficulty expected  Dental - normal exam     Pulmonary - negative pulmonary ROS and normal exam   (-) COPD, asthma, not a smoker, lung cancer  Cardiovascular - normal exam  Exercise tolerance: good (4-7 METS)    Rhythm: regular  Rate: normal    (+) hypertension well controlled less than 2 medications,   (-) valvular problems/murmurs, past MI, CAD, dysrhythmias, angina, CHF, cardiac stents, CABG, pericardial effusion      Neuro/Psych- negative ROS  (-) seizures, TIA, CVA  GI/Hepatic/Renal/Endo    (+) obesity,   thyroid problem   (-) hiatal hernia, GERD, PUD, hepatitis, liver disease, no renal disease, diabetes, GI bleed    Musculoskeletal (-) negative ROS    Abdominal  - normal exam   Substance History - negative use     OB/GYN negative ob/gyn ROS         Other - negative ROS                       Anesthesia Plan    ASA 2     MAC     intravenous induction     Anesthetic plan, all risks, benefits, and alternatives have been provided, discussed and informed consent has been obtained with: patient.        CODE STATUS:

## 2022-03-15 NOTE — ANESTHESIA POSTPROCEDURE EVALUATION
Patient: Nacho Schwartz    Procedure Summary     Date: 03/15/22 Room / Location: [facility] / [facility]    Anesthesia Start: 1426 Anesthesia Stop: 1500    Procedure: COLONOSCOPY WITH POLYPECTOMY (N/A ) Diagnosis:       Screen for colon cancer      Family history of colonic polyps      Colon polyp      (Screen for colon cancer [Z12.11])      (Family history of colonic polyps [Z83.71])    Surgeons: Noman Olmedo MD Provider: Cb Michelle MD    Anesthesia Type: MAC ASA Status: 2          Anesthesia Type: MAC    Vitals  Vitals Value Taken Time   BP     Temp     Pulse 83 03/15/22 1501   Resp     SpO2 83 % 03/15/22 1501   Vitals shown include unvalidated device data.        Post Anesthesia Care and Evaluation    Patient location during evaluation: bedside  Patient participation: complete - patient participated  Level of consciousness: responsive to light touch, responsive to verbal stimuli and sleepy but conscious  Pain score: 0  Pain management: adequate  Airway patency: patent  Anesthetic complications: No anesthetic complications  PONV Status: none  Cardiovascular status: acceptable and hemodynamically stable  Respiratory status: acceptable  Hydration status: acceptable

## 2022-03-17 LAB
LAB AP CASE REPORT: NORMAL
LAB AP CLINICAL INFORMATION: NORMAL
PATH REPORT.FINAL DX SPEC: NORMAL
PATH REPORT.GROSS SPEC: NORMAL

## 2022-05-10 DIAGNOSIS — I10 UNCONTROLLED HYPERTENSION: ICD-10-CM

## 2022-05-10 RX ORDER — HYDROCHLOROTHIAZIDE 12.5 MG/1
TABLET ORAL
Qty: 90 TABLET | Refills: 1 | Status: SHIPPED | OUTPATIENT
Start: 2022-05-10 | End: 2022-11-08

## 2022-08-01 DIAGNOSIS — I10 ESSENTIAL HYPERTENSION: ICD-10-CM

## 2022-08-01 RX ORDER — LOSARTAN POTASSIUM 50 MG/1
TABLET ORAL
Qty: 90 TABLET | Refills: 1 | Status: SHIPPED | OUTPATIENT
Start: 2022-08-01 | End: 2023-02-13

## 2022-10-10 ENCOUNTER — OFFICE VISIT (OUTPATIENT)
Dept: FAMILY MEDICINE CLINIC | Facility: CLINIC | Age: 47
End: 2022-10-10

## 2022-10-10 VITALS
HEIGHT: 68 IN | RESPIRATION RATE: 16 BRPM | DIASTOLIC BLOOD PRESSURE: 80 MMHG | BODY MASS INDEX: 32.58 KG/M2 | SYSTOLIC BLOOD PRESSURE: 130 MMHG | WEIGHT: 215 LBS | OXYGEN SATURATION: 98 % | HEART RATE: 90 BPM | TEMPERATURE: 98.2 F

## 2022-10-10 DIAGNOSIS — E05.00 GRAVES' DISEASE IN REMISSION: ICD-10-CM

## 2022-10-10 DIAGNOSIS — I10 ESSENTIAL HYPERTENSION: ICD-10-CM

## 2022-10-10 DIAGNOSIS — Z00.00 ANNUAL PHYSICAL EXAM: Primary | ICD-10-CM

## 2022-10-10 PROCEDURE — 99396 PREV VISIT EST AGE 40-64: CPT | Performed by: NURSE PRACTITIONER

## 2022-10-10 NOTE — PROGRESS NOTES
"Chief Complaint   Patient presents with   • Annual Exam       Patient Care Team:  Head, CESIA Granger as PCP - General (Nurse Practitioner)    Subjective   Nacho Schwartz is a 47 y.o. female and is here for a yearly physical exam. The patient reports no problems.    Do you take any herbs or supplements that were not prescribed by a doctor? no. If so, these will be added to active medication list.    Health Habits:  Dental Exam: Up to date - 2 weeks ago   Eye Exam: Appt today.    Diet: Nothing specific  Exercise:   Current exercise activities include: walking   Pap:  Managed per GYN, Dr. Herrera.  UTD.  Mammogram: UTD 2/21/2022  Dexa: N/A  Colonoscopy:Brief Op Note by Noman Olmedo MD (03/15/2022 14:37)       The following portions of the patient's history were reviewed and updated as appropriate: allergies, current medications, past family history, past medical history, past social history, past surgical history and problem list.    Social and Family and Surgical History reviewed and updated today, see Rooming tab.    Health History, Preventive Measures and Vaccination flow sheets reviewed and updated today.    Patient's current medical chart in Epic; including previous office notes, imaging, labs, specialist's evaluation either in notes or in Media tab reviewed today.    Other pertinent medical information also reviewed thru Care Everywhere function is also reviewed today.    Review of Systems  Review of Systems  Vitals:    10/10/22 1031   BP: 130/80   BP Location: Left arm   Patient Position: Sitting   Cuff Size: Adult   Pulse: 90   Resp: 16   Temp: 98.2 °F (36.8 °C)   SpO2: 98%   Weight: 97.5 kg (215 lb)   Height: 172.7 cm (68\")     Wt Readings from Last 3 Encounters:   10/10/22 97.5 kg (215 lb)   03/15/22 86.6 kg (191 lb)   10/11/21 82.1 kg (181 lb)       General Appearance:  Alert, cooperative, no distress, appears stated age   Head:  Normocephalic, without obvious abnormality, atraumatic   Eyes:  " PERRL, conjunctiva/corneas clear, EOM's intact.   Ears:  Normal TM's and external ear canals, both ears   Nose: Nares normal, septum midline, mucosa normal, no drainage or sinus tenderness   Throat: Lips, mucosa, and tongue normal; teeth and gums normal   Neck: Supple, symmetrical, trachea midline, no adenopathy;   thyroid: No enlargement/tenderness/nodules       Lungs:  Clear to auscultation bilaterally, respirations even and unlabored   Chest wall:  No tenderness or deformity   Heart:  Regular rate and rhythm, S1 and S2 normal, no murmur, rub or gallop   Abdomen:  Soft, non-tender, bowel sounds active all four quadrants,   no masses, no organomegaly           Extremities: Extremities normal, atraumatic, no cyanosis or edema   Pulses: 2+ and symmetric all extremities   Skin: Skin color, texture, turgor normal, no rashes or lesions   Lymph nodes: Cervical, supraclavicular, and axillary nodes normal   Neurologic: CNII-XII intact. Normal strength, sensation and reflexes   throughout     SCANNED EKG (01/26/2021)    NSR    Results for orders placed or performed during the hospital encounter of 03/15/22   POC Urine Pregnancy    Specimen: Urine   Result Value Ref Range    HCG, Urine, QL Negative Negative    Lot Number [id number]     Internal Positive Control Passed Positive, Passed    Internal Negative Control Passed Negative, Passed    Expiration Date 04-    Tissue Pathology Exam    Specimen: Large Intestine, Sigmoid Colon; Polyp   Result Value Ref Range    Case Report       Surgical Pathology Report                         Case: [id number]                                  Authorizing Provider:  Noman Olmedo        Collected:           03/15/2022 02:52 PM                                 MD Zuhair                                                                   Ordering Location:     [facility]     Received:            03/15/2022 03:55 PM                                 [facility]                                                      Pathologist:           Tere Rebolledo MD                                                    Specimen:    Large Intestine, Sigmoid Colon, sigmoid colon polyp                                        Clinical Information       sigmoid colon polyp      Final Diagnosis       1. Sigmoid Colon, Biopsy:   A. Benign polypoid colonic mucosa with submucosal lymphoid aggregate.   B. Focal mild hyperplastic change.   C. No evidence of active colitis nor dysplasia identified.    SWM/clm        Gross Description       1. Large Intestine, Sigmoid Colon.  The specimen is received in formalin labeled with the patient's name and further designated 'sigmoid colon  polyp biopsy' is a small fragment of gray-tan tissue. The specimen is submitted for embedding as received.           Assessment & Plan   Healthy female exam.  Diagnoses and all orders for this visit:    1. Annual physical exam (Primary)  -     Thyroid Peroxidase Antibody; Future  -     TSH; Future  -     T4, Free; Future  -     T3, Free; Future  -     Lipid Panel With / Chol / HDL Ratio; Future  -     Comprehensive Metabolic Panel; Future  -     CBC (No Diff); Future    2. Graves' disease in remission  -     Thyroid Peroxidase Antibody; Future  -     TSH; Future  -     T4, Free; Future  -     T3, Free; Future    3. Essential hypertension  -     Lipid Panel With / Chol / HDL Ratio; Future  -     Comprehensive Metabolic Panel; Future  -     CBC (No Diff); Future      1. Nacho Schwartz has been doing well.  Written lab orders given to patient.  She has just completed at [facility].  We will notify her of results once completed.  Blood pressure currently stable at 130/80.  No changes in medications at this time.  If all labs are stable, strict return to office in 1 year for next annual physical with fasting labs.  In the meantime, instructed contact us sooner for any problems or concerns.  2. Patient Counseling:  --Nutrition:  Stressed importance of moderation in sodium/caffeine intake, saturated fat and cholesterol.  Discussed caloric balance, sufficient intake of fresh fruits, vegetables, fiber,    calcium, iron.  --Exercise: Stressed the importance of regular exercise.   --Substance Abuse: Discussed cessation/primary prevention of tobacco, alcohol, or other drug use; driving or other dangerous activities under the influence.    --Dental health: Discussed importance of regular tooth brushing, flossing, and dental visits.  --Suggested having eyes and vision checked if needed or past due.  --Immunizations reviewed.  --Discussed benefits of screening colonoscopy.  3. Discussed the patient's BMI with her.  The BMI is above average; BMI management plan is completed  4. Follow up next physical in 1 year    Patient was given instructions and counseling regarding condition or for health maintenance advice.  Please see specific information pulled into the AVS if appropriate.      There are no discontinued medications.       Patient was wearing facemask when I entered the room and throughout our encounter. Protective equipment was worn by me throughout this patient encounter including a face mask.  Hand hygiene was performed before donning protective equipment and after removal when leaving the room.     CESIA Landin  Family Practice  [facility] [place]

## 2022-11-08 DIAGNOSIS — I10 UNCONTROLLED HYPERTENSION: ICD-10-CM

## 2022-11-08 RX ORDER — HYDROCHLOROTHIAZIDE 12.5 MG/1
TABLET ORAL
Qty: 90 TABLET | Refills: 1 | Status: SHIPPED | OUTPATIENT
Start: 2022-11-08

## 2023-02-13 DIAGNOSIS — I10 ESSENTIAL HYPERTENSION: ICD-10-CM

## 2023-02-13 RX ORDER — LOSARTAN POTASSIUM 50 MG/1
TABLET ORAL
Qty: 90 TABLET | Refills: 1 | Status: SHIPPED | OUTPATIENT
Start: 2023-02-13

## 2023-05-09 DIAGNOSIS — I10 UNCONTROLLED HYPERTENSION: ICD-10-CM

## 2023-05-09 RX ORDER — HYDROCHLOROTHIAZIDE 12.5 MG/1
TABLET ORAL
Qty: 90 TABLET | Refills: 1 | Status: SHIPPED | OUTPATIENT
Start: 2023-05-09

## 2023-08-14 DIAGNOSIS — I10 ESSENTIAL HYPERTENSION: ICD-10-CM

## 2023-08-14 RX ORDER — LOSARTAN POTASSIUM 50 MG/1
TABLET ORAL
Qty: 90 TABLET | Refills: 1 | Status: SHIPPED | OUTPATIENT
Start: 2023-08-14

## 2023-10-09 ENCOUNTER — OFFICE VISIT (OUTPATIENT)
Dept: FAMILY MEDICINE CLINIC | Facility: CLINIC | Age: 48
End: 2023-10-09
Payer: COMMERCIAL

## 2023-10-09 VITALS
HEART RATE: 61 BPM | BODY MASS INDEX: 28.04 KG/M2 | SYSTOLIC BLOOD PRESSURE: 122 MMHG | HEIGHT: 68 IN | DIASTOLIC BLOOD PRESSURE: 80 MMHG | TEMPERATURE: 97.3 F | WEIGHT: 185 LBS | OXYGEN SATURATION: 100 %

## 2023-10-09 DIAGNOSIS — Z00.00 ANNUAL PHYSICAL EXAM: Primary | ICD-10-CM

## 2023-10-09 DIAGNOSIS — I10 ESSENTIAL HYPERTENSION: ICD-10-CM

## 2023-10-09 DIAGNOSIS — Z12.4 CERVICAL CANCER SCREENING: ICD-10-CM

## 2023-10-09 DIAGNOSIS — E05.00 GRAVES' DISEASE IN REMISSION: ICD-10-CM

## 2023-10-09 DIAGNOSIS — M72.2 PLANTAR FASCIITIS OF RIGHT FOOT: ICD-10-CM

## 2023-10-09 PROCEDURE — 99396 PREV VISIT EST AGE 40-64: CPT | Performed by: NURSE PRACTITIONER

## 2023-10-09 RX ORDER — MELOXICAM 15 MG/1
15 TABLET ORAL DAILY
Qty: 30 TABLET | Refills: 0 | Status: SHIPPED | OUTPATIENT
Start: 2023-10-09

## 2023-10-09 RX ORDER — METHYLPREDNISOLONE 4 MG/1
TABLET ORAL
Qty: 21 EACH | Refills: 0 | Status: SHIPPED | OUTPATIENT
Start: 2023-10-09

## 2023-10-09 NOTE — PROGRESS NOTES
Answers submitted by the patient for this visit:  Primary Reason for Visit (Submitted on 10/9/2023)  What is the primary reason for your visit?: Physical    Chief Complaint   Patient presents with    Annual Exam       Patient Care Team:  Head, CESIA Granger as PCP - General (Nurse Practitioner)    Subjective   Nacho Schwartz is a 48 y.o. female and is here for a yearly physical exam. The patient reports problems - : .    Right plantar fasciitis.  Onset a couple of months.  Has been performing stretches at home.  Has been wearing inserts in shoes. No improvement.     Do you take any herbs or supplements that were not prescribed by a doctor? no. If so, these will be added to active medication list.    Health Habits:  Dental Exam: Up to date - Having problems with gums.  May need graft.  Eye Exam: Due.    Diet: Nothing specific  Exercise:   Current exercise activities include: walking   Pap:  Managed per GYN, Dr. Herrera.  UTD.3/15/22.  Underwent uterine ablation July 2022. No longer having menstrual periods.    Mammogram: Mammo Screening Digital Tomosynthesis Bilateral With CAD (03/04/2023 08:17)  Dexa: N/A  Colonoscopy:Brief Op Note by Noman Olmedo MD (03/15/2022 14:37)   Non-smoker and no alcohol use.     The following portions of the patient's history were reviewed and updated as appropriate: allergies, current medications, past family history, past medical history, past social history, past surgical history, and problem list.    Social and Family and Surgical History reviewed and updated today, see Rooming tab.    Health History, Preventive Measures and Vaccination flow sheets reviewed and updated today.    Patient's current medical chart in Epic; including previous office notes, imaging, labs, specialist's evaluation either in notes or in Media tab reviewed today.    Other pertinent medical information also reviewed thru Care Everywhere function is also reviewed today.    Review of Systems  Review of  "Systems  Vitals:    10/09/23 0830   BP: 122/80   BP Location: Left arm   Patient Position: Sitting   Cuff Size: Adult   Pulse: 61   Temp: 97.3 øF (36.3 øC)   SpO2: 100%   Weight: 83.9 kg (185 lb)   Height: 172.7 cm (68\")     Wt Readings from Last 3 Encounters:   10/09/23 83.9 kg (185 lb)   10/10/22 97.5 kg (215 lb)   03/15/22 86.6 kg (191 lb)       General Appearance:  Alert, cooperative, no distress, appears stated age   Head:  Normocephalic, without obvious abnormality, atraumatic   Eyes:  PERRL, conjunctiva/corneas clear, EOM's intact.   Ears:  Normal TM's and external ear canals, both ears   Nose: Nares normal, septum midline, mucosa normal, no drainage or sinus tenderness   Throat: Lips, mucosa, and tongue normal; teeth and gums normal   Neck: Supple, symmetrical, trachea midline, no adenopathy;   thyroid: No enlargement/tenderness/nodules       Lungs:  Clear to auscultation bilaterally, respirations even and unlabored   Chest wall:  No tenderness or deformity   Heart:  Regular rate and rhythm, S1 and S2 normal, no murmur, rub or gallop   Abdomen:  Soft, non-tender, bowel sounds active all four quadrants,   no masses, no organomegaly           Extremities: Extremities normal, atraumatic, no cyanosis or edema   Pulses: 2+ and symmetric all extremities   Skin: Skin color, texture, turgor normal, no rashes or lesions   Lymph nodes: Cervical and supraclavicular nodes normal   Neurologic: CNII-XII intact. Normal strength.       Patient's (Body mass index is 28.13 kg/mý.) indicates that they are overweight (BMI 25-29.9) with health related conditions that include hypertension . Weight is improving with lifestyle modifications. BMI is is above average; BMI management plan is completed. We discussed portion control and increasing exercise.       Assessment & Plan   Healthy female exam.  Diagnoses and all orders for this visit:    1. Annual physical exam (Primary)  -     CBC (No Diff); Future  -     Comprehensive " Metabolic Panel; Future  -     Lipid Panel With / Chol / HDL Ratio; Future  -     T3, Free; Future  -     T4, Free; Future  -     TSH; Future    2. Essential hypertension  -     CBC (No Diff); Future  -     Comprehensive Metabolic Panel; Future  -     Lipid Panel With / Chol / HDL Ratio; Future  -     T3, Free; Future  -     T4, Free; Future  -     TSH; Future    3. Graves' disease in remission  -     CBC (No Diff); Future  -     Comprehensive Metabolic Panel; Future  -     Lipid Panel With / Chol / HDL Ratio; Future  -     T3, Free; Future  -     T4, Free; Future  -     TSH; Future    4. Plantar fasciitis of right foot  -     meloxicam (MOBIC) 15 MG tablet; Take 1 tablet by mouth Daily.  Dispense: 30 tablet; Refill: 0  -     methylPREDNISolone (MEDROL) 4 MG dose pack; Take as directed on package instructions.  Dispense: 21 each; Refill: 0      1. Nacho Schwartz has been doing well since being seen except for flare up of plantar fasciitis.  Instructions given for home.  Start meloxicam and medrol dose pack as directed.  Also request referral to new GYN.  Printed lab orders to take with her to [facility].  Will notify of results.    2. Patient Counseling:  --Nutrition: Stressed importance of moderation in sodium/caffeine intake, saturated fat and cholesterol.  Discussed caloric balance, sufficient intake of fresh fruits, vegetables, fiber,    calcium, iron.  --Exercise: Stressed the importance of regular exercise.   --Substance Abuse: Discussed cessation/primary prevention of tobacco, alcohol, or other drug use; driving or other dangerous activities under the influence.    --Dental health: Discussed importance of regular tooth brushing, flossing, and dental visits.  --Suggested having eyes and vision checked if needed or past due.  --Immunizations reviewed.  --Discussed benefits of screening colonoscopy.  3. Discussed the patient's BMI with her.  The BMI is above average; BMI management plan is completed  4. Follow up next  physical in 1 year    Patient was given instructions and counseling regarding condition or for health maintenance advice.  Please see specific information pulled into the AVS if appropriate.      There are no discontinued medications.       Dolores Shepherd, CESIA  Family Practice  [facility] [place]

## 2023-11-10 DIAGNOSIS — I10 UNCONTROLLED HYPERTENSION: ICD-10-CM

## 2023-11-10 RX ORDER — HYDROCHLOROTHIAZIDE 12.5 MG/1
12.5 TABLET ORAL DAILY
Qty: 90 TABLET | Refills: 1 | Status: SHIPPED | OUTPATIENT
Start: 2023-11-10

## 2024-02-11 DIAGNOSIS — I10 ESSENTIAL HYPERTENSION: ICD-10-CM

## 2024-02-12 RX ORDER — LOSARTAN POTASSIUM 50 MG/1
TABLET ORAL
Qty: 90 TABLET | Refills: 1 | Status: SHIPPED | OUTPATIENT
Start: 2024-02-12

## 2024-05-10 DIAGNOSIS — I10 UNCONTROLLED HYPERTENSION: ICD-10-CM

## 2024-05-10 RX ORDER — HYDROCHLOROTHIAZIDE 12.5 MG/1
12.5 TABLET ORAL DAILY
Qty: 90 TABLET | Refills: 1 | Status: SHIPPED | OUTPATIENT
Start: 2024-05-10

## 2024-08-09 DIAGNOSIS — I10 ESSENTIAL HYPERTENSION: ICD-10-CM

## 2024-08-09 RX ORDER — LOSARTAN POTASSIUM 50 MG/1
TABLET ORAL
Qty: 90 TABLET | Refills: 1 | Status: SHIPPED | OUTPATIENT
Start: 2024-08-09

## 2024-10-14 ENCOUNTER — OFFICE VISIT (OUTPATIENT)
Dept: FAMILY MEDICINE CLINIC | Facility: CLINIC | Age: 49
End: 2024-10-14
Payer: OTHER GOVERNMENT

## 2024-10-14 VITALS
WEIGHT: 210 LBS | DIASTOLIC BLOOD PRESSURE: 80 MMHG | SYSTOLIC BLOOD PRESSURE: 120 MMHG | TEMPERATURE: 97.7 F | HEART RATE: 76 BPM | BODY MASS INDEX: 31.83 KG/M2 | OXYGEN SATURATION: 99 % | HEIGHT: 68 IN

## 2024-10-14 DIAGNOSIS — E05.00 GRAVES' DISEASE IN REMISSION: ICD-10-CM

## 2024-10-14 DIAGNOSIS — I10 ESSENTIAL HYPERTENSION: ICD-10-CM

## 2024-10-14 DIAGNOSIS — Z00.00 ANNUAL PHYSICAL EXAM: Primary | ICD-10-CM

## 2024-10-14 PROCEDURE — 99396 PREV VISIT EST AGE 40-64: CPT | Performed by: NURSE PRACTITIONER

## 2024-10-14 RX ORDER — HYDROCHLOROTHIAZIDE 12.5 MG/1
12.5 TABLET ORAL DAILY
Qty: 90 TABLET | Refills: 3 | Status: SHIPPED | OUTPATIENT
Start: 2024-10-14

## 2024-10-14 RX ORDER — LOSARTAN POTASSIUM 50 MG/1
50 TABLET ORAL DAILY
Qty: 90 TABLET | Refills: 3 | Status: SHIPPED | OUTPATIENT
Start: 2024-10-14

## 2024-10-14 NOTE — PROGRESS NOTES
Chief Complaint   Patient presents with    Annual Exam     Will need to get labs at [facility]     Hypertension       Patient Care Team:  Head, CESIA Granger as PCP - General (Nurse Practitioner)    Subjective   Nacho Schwartz is a 49 y.o. female and is here for a yearly physical exam. The patient reports no problems.    Hypertension: Managed with losartan 50 mg daily and hydrochlorothiazide 12.5 mg daily.  History Grave's disease which is in remission.  No medications.    Do you take any herbs or supplements that were not prescribed by a doctor? no. If so, these will be added to active medication list.    Health Habits:  Dental Exam: Up to date   Eye Exam: Due.  Scheduled today.    Diet: Nothing specific - Plans to monitor intake on nakia.   Exercise:   Current exercise activities include: walking   Pap:  Recent hysterectomy in April 2024 - ovaries still in place.   Mammogram: Mammo Screening Digital Tomosynthesis Bilateral With CAD (03/04/2023 08:17)  Dexa: N/A  Colonoscopy:Brief Op Note by Noman Olmedo MD (03/15/2022 14:37)   Non-smoker and no alcohol use.     The following portions of the patient's history were reviewed and updated as appropriate: allergies, current medications, past family history, past medical history, past social history, past surgical history, and problem list.    Social and Family and Surgical History reviewed and updated today, see Rooming tab.    Health History, Preventive Measures and Vaccination flow sheets reviewed and updated today.    Patient's current medical chart in Epic; including previous office notes, imaging, labs, specialist's evaluation either in notes or in Media tab reviewed today.    Other pertinent medical information also reviewed thru Care Everywhere function is also reviewed today.    Review of Systems  Review of Systems  Vitals:    10/14/24 1014   BP: 120/80   BP Location: Left arm   Patient Position: Sitting   Cuff Size: Adult   Pulse: 76   Temp: 97.7 °F (36.5  "°C)   SpO2: 99%   Weight: 95.3 kg (210 lb)   Height: 172.7 cm (68\")     Wt Readings from Last 3 Encounters:   10/14/24 95.3 kg (210 lb)   10/09/23 83.9 kg (185 lb)   10/10/22 97.5 kg (215 lb)           General Appearance:  Alert, cooperative, no distress, appears stated age   Head:  Normocephalic, without obvious abnormality, atraumatic   Eyes:  PERRL, conjunctiva/corneas clear, EOM's intact.   Ears:  Normal TM's and external ear canals, both ears   Nose: Nares normal, septum midline, mucosa normal, no drainage or sinus tenderness   Throat: Lips, mucosa, and tongue normal; teeth and gums normal   Neck: Supple, symmetrical, trachea midline, no adenopathy;   thyroid: No enlargement/tenderness/nodules       Lungs:  Clear to auscultation bilaterally, respirations even and unlabored   Chest wall:  No tenderness or deformity   Heart:  Regular rate and rhythm, S1 and S2 normal, no murmur, rub or gallop   Abdomen:  Soft, non-tender, bowel sounds active all four quadrants,   no masses, no organomegaly           Extremities: Extremities normal, atraumatic, no cyanosis or edema   Pulses: 2+ and symmetric all extremities   Skin: Skin color, texture, turgor normal, no rashes or lesions   Lymph nodes: Cervical and supraclavicular nodes normal   Neurologic: CNII-XII intact. Normal strength.       BMI is >= 25 and <30. (Overweight) The following options were offered after discussion;: exercise counseling/recommendations, nutrition counseling/recommendations, and information on healthy weight added to patient's after visit summary          Assessment & Plan   Healthy female exam.  Diagnoses and all orders for this visit:    1. Annual physical exam (Primary)  -     CBC (No Diff); Future  -     Comprehensive Metabolic Panel; Future  -     Lipid Panel With / Chol / HDL Ratio; Future    2. Essential hypertension  -     Comprehensive Metabolic Panel; Future  -     Lipid Panel With / Chol / HDL Ratio    3. Graves' disease in remission  -  "    TSH  -     T4, Free  -     T3, Free      1. Nacho Schwartz has been doing well since she was last seen.  She is actually underwent hysterectomy since she was last seen which is went well.  Blood pressure is stable at 120/80.  She will get blood work completed at [facility].  Will notify her of results once completed.  If all are stable, strict return to office in 1 year for next annual physical.  In the meantime, instructed contact us sooner for any problems or concerns.  2. Patient Counseling:  --Nutrition: Stressed importance of moderation in sodium/caffeine intake, saturated fat and cholesterol.  Discussed caloric balance, sufficient intake of fresh fruits, vegetables, fiber,    calcium, iron.  --Exercise: Stressed the importance of regular exercise.   --Substance Abuse: Discussed cessation/primary prevention of tobacco, alcohol, or other drug use; driving or other dangerous activities under the influence.    --Dental health: Discussed importance of regular tooth brushing, flossing, and dental visits.  --Suggested having eyes and vision checked if needed or past due.  --Immunizations reviewed.  Discussed shingles vaccine.  --Discussed benefits of screening colonoscopy.  3. Discussed the patient's BMI with her.  The BMI is above average; BMI management plan is completed  4. Follow up next physical in 1 year    Patient was given instructions and counseling regarding condition or for health maintenance advice.  Please see specific information pulled into the AVS if appropriate.      Medications Discontinued During This Encounter   Medication Reason    methylPREDNISolone (MEDROL) 4 MG dose pack *Therapy completed    meloxicam (MOBIC) 15 MG tablet *Therapy completed    hydroCHLOROthiazide 12.5 MG tablet Reorder    losartan (COZAAR) 50 MG tablet Reorder          Dolores Head, APRN  Family Practice  [facility]

## 2024-12-27 LAB
ALBUMIN SERPL-MCNC: 4.2 G/DL (ref 3.5–5.2)
ALBUMIN/GLOB SERPL: 1.8 G/DL
ALP SERPL-CCNC: 75 U/L (ref 39–117)
ALT SERPL-CCNC: 20 U/L (ref 1–33)
AST SERPL-CCNC: 15 U/L (ref 1–32)
BILIRUB SERPL-MCNC: <0.2 MG/DL (ref 0–1.2)
BUN SERPL-MCNC: 13 MG/DL (ref 6–20)
BUN/CREAT SERPL: 17.3 (ref 7–25)
CALCIUM SERPL-MCNC: 9.4 MG/DL (ref 8.6–10.5)
CHLORIDE SERPL-SCNC: 104 MMOL/L (ref 98–107)
CHOLEST SERPL-MCNC: 205 MG/DL (ref 0–200)
CHOLEST/HDLC SERPL: 5.26 {RATIO}
CO2 SERPL-SCNC: 29.9 MMOL/L (ref 22–29)
CREAT SERPL-MCNC: 0.75 MG/DL (ref 0.57–1)
EGFRCR SERPLBLD CKD-EPI 2021: 96.5 ML/MIN/1.73
ERYTHROCYTE [DISTWIDTH] IN BLOOD BY AUTOMATED COUNT: 11.6 % (ref 12.3–15.4)
GLOBULIN SER CALC-MCNC: 2.4 GM/DL
GLUCOSE SERPL-MCNC: 125 MG/DL (ref 65–99)
HBA1C MFR BLD: 5.9 % (ref 4.8–5.6)
HCT VFR BLD AUTO: 32.9 % (ref 34–46.6)
HDLC SERPL-MCNC: 39 MG/DL (ref 40–60)
HGB BLD-MCNC: 11.5 G/DL (ref 12–15.9)
LDLC SERPL CALC-MCNC: 132 MG/DL (ref 0–100)
MCH RBC QN AUTO: 31.9 PG (ref 26.6–33)
MCHC RBC AUTO-ENTMCNC: 35 G/DL (ref 31.5–35.7)
MCV RBC AUTO: 91.4 FL (ref 79–97)
PLATELET # BLD AUTO: 310 10*3/MM3 (ref 140–450)
POTASSIUM SERPL-SCNC: 3.9 MMOL/L (ref 3.5–5.2)
PROT SERPL-MCNC: 6.6 G/DL (ref 6–8.5)
RBC # BLD AUTO: 3.6 10*6/MM3 (ref 3.77–5.28)
SODIUM SERPL-SCNC: 140 MMOL/L (ref 136–145)
T3FREE SERPL-MCNC: 3.2 PG/ML (ref 2–4.4)
T4 FREE SERPL-MCNC: 0.87 NG/DL (ref 0.92–1.68)
TRIGL SERPL-MCNC: 188 MG/DL (ref 0–150)
TSH SERPL DL<=0.005 MIU/L-ACNC: 3.21 UIU/ML (ref 0.27–4.2)
VLDLC SERPL CALC-MCNC: 34 MG/DL (ref 5–40)
WBC # BLD AUTO: 5.5 10*3/MM3 (ref 3.4–10.8)
WRITTEN AUTHORIZATION: NORMAL

## (undated) DEVICE — SINGLE-USE BIOPSY FORCEPS: Brand: RADIAL JAW 4

## (undated) DEVICE — SYR LUER SLPTP 50ML

## (undated) DEVICE — FLEX ADVANTAGE 1500CC: Brand: FLEX ADVANTAGE

## (undated) DEVICE — Device

## (undated) DEVICE — CANN NASL CO2 TRULINK W/O2 A/

## (undated) DEVICE — KT ORCA ORCAPOD DISP STRL

## (undated) DEVICE — JACKT LAB F/R KNIT CUFF/COLR XLG BLU

## (undated) DEVICE — VIAL FORMLN CAP 10PCT 20ML